# Patient Record
Sex: FEMALE | Race: WHITE | NOT HISPANIC OR LATINO | Employment: FULL TIME | URBAN - METROPOLITAN AREA
[De-identification: names, ages, dates, MRNs, and addresses within clinical notes are randomized per-mention and may not be internally consistent; named-entity substitution may affect disease eponyms.]

---

## 2019-01-09 ENCOUNTER — TRANSCRIBE ORDERS (OUTPATIENT)
Dept: ADMINISTRATIVE | Facility: HOSPITAL | Age: 44
End: 2019-01-09

## 2020-09-24 ENCOUNTER — OFFICE VISIT (OUTPATIENT)
Dept: OBSTETRICS AND GYNECOLOGY | Facility: CLINIC | Age: 45
End: 2020-09-24

## 2020-09-24 VITALS
BODY MASS INDEX: 31.71 KG/M2 | HEIGHT: 67 IN | DIASTOLIC BLOOD PRESSURE: 84 MMHG | WEIGHT: 202 LBS | SYSTOLIC BLOOD PRESSURE: 144 MMHG

## 2020-09-24 DIAGNOSIS — Z71.85 HPV VACCINE COUNSELING: ICD-10-CM

## 2020-09-24 DIAGNOSIS — N83.202 BILATERAL OVARIAN CYSTS: ICD-10-CM

## 2020-09-24 DIAGNOSIS — D39.10 OVARIAN NEOPLASM WITH LOW MALIGNANT POTENTIAL: ICD-10-CM

## 2020-09-24 DIAGNOSIS — D25.1 LEIOMYOMA, INTRAMURAL: ICD-10-CM

## 2020-09-24 DIAGNOSIS — Z01.419 WOMEN'S ANNUAL ROUTINE GYNECOLOGICAL EXAMINATION: Primary | ICD-10-CM

## 2020-09-24 DIAGNOSIS — N83.201 BILATERAL OVARIAN CYSTS: ICD-10-CM

## 2020-09-24 DIAGNOSIS — N85.2 ENLARGED UTERUS: ICD-10-CM

## 2020-09-24 PROCEDURE — 99396 PREV VISIT EST AGE 40-64: CPT | Performed by: OBSTETRICS & GYNECOLOGY

## 2020-09-24 PROCEDURE — 99212 OFFICE O/P EST SF 10 MIN: CPT | Performed by: OBSTETRICS & GYNECOLOGY

## 2020-09-24 RX ORDER — METOPROLOL SUCCINATE 25 MG/1
25 TABLET, EXTENDED RELEASE ORAL DAILY
COMMUNITY
Start: 2020-09-10

## 2020-09-24 NOTE — PROGRESS NOTES
2GYN Annual Exam     CC - Here for annual exam.     Subjective   HPI  Mayelin Penn is a 44 y.o. female, , who presents for annual well woman exam. Patient's last menstrual period was 2020..  Periods are regular every 25-35 days, lasting 5 days. The patient uses 1 of tampons/pads per hour., lasting 1 days.  Dysmenorrhea:none.  Patient reports problems with: none.  Partner Status: Marital Status: .  New Partners since last visit: no.  Desires STD Screening: no.  Patient has not had the HPV vaccine.    Additional OB/GYN History   Current contraception: contraceptive methods: Tubal ligation  Desires to: continue contraception  Last Pap : 2019 neg, neg HPV  Last Completed Pap Smear       Status Date      PAP SMEAR No completions recorded        History of abnormal Pap smear: yes - h/o cone   Last mammogram 20 benign  Family history of uterine, colon, breast, or ovarian cancer: no  Performs monthly Self-Breast Exam: yes  Exercises Regularly:no  Feelings of Anxiety or Depression: no  Tobacco Usage?: No   OB History        3    Para   3    Term                AB        Living   3       SAB        TAB        Ectopic        Molar        Multiple        Live Births                    Health Maintenance   Topic Date Due   • Annual Gynecologic Pelvic and Breast Exam  1977   • ANNUAL PHYSICAL  10/16/1980   • TDAP/TD VACCINES (1 - Tdap) 10/16/1996   • INFLUENZA VACCINE  2020   • HEPATITIS C SCREENING  2020   • PAP SMEAR  2020   • Pneumococcal Vaccine 65+ (1 of 1 - PPSV23) 10/16/2042   • Pneumococcal Vaccine 0-64  Aged Out       The additional following portions of the patient's history were reviewed and updated as appropriate: allergies, current medications, past family history, past medical history, past social history, past surgical history and problem list.    Review of Systems   Constitutional: Negative.    HENT: Negative.    Eyes: Negative.    Respiratory:  "Negative.    Cardiovascular: Negative.    Gastrointestinal: Negative.    Endocrine: Negative.    Genitourinary: Negative.    Musculoskeletal: Negative.    Skin: Negative.    Allergic/Immunologic: Negative.    Neurological: Negative.    Hematological: Negative.    Psychiatric/Behavioral: Negative.        I have reviewed and agree with the HPI, ROS, and historical information as entered above. Ana Vargas MD    Objective   /84   Ht 170.2 cm (67\")   Wt 91.6 kg (202 lb)   LMP 09/07/2020   BMI 31.64 kg/m²     Physical Exam  Vitals signs and nursing note reviewed. Exam conducted with a chaperone present.   Constitutional:       Appearance: She is well-developed.   HENT:      Head: Normocephalic and atraumatic.   Neck:      Musculoskeletal: Normal range of motion. No muscular tenderness.      Thyroid: No thyroid mass or thyromegaly.   Cardiovascular:      Rate and Rhythm: Normal rate and regular rhythm.      Heart sounds: No murmur.   Pulmonary:      Effort: Pulmonary effort is normal. No retractions.      Breath sounds: Normal breath sounds. No wheezing, rhonchi or rales.   Chest:      Chest wall: No mass or tenderness.      Breasts:         Right: Normal. No mass, nipple discharge, skin change or tenderness.         Left: Normal. No mass, nipple discharge, skin change or tenderness.   Abdominal:      General: Bowel sounds are normal.      Palpations: Abdomen is soft. Abdomen is not rigid. There is no mass.      Tenderness: There is no abdominal tenderness. There is no guarding.      Hernia: No hernia is present. There is no hernia in the left inguinal area.   Genitourinary:     Labia:         Right: No rash, tenderness or lesion.         Left: No rash, tenderness or lesion.       Vagina: Normal. No vaginal discharge or lesions.      Cervix: No cervical motion tenderness, discharge, lesion or cervical bleeding.      Uterus: Normal. Not enlarged, not fixed and not tender.       Adnexa:         Right: No mass " or tenderness.          Left: No mass or tenderness.        Rectum: No external hemorrhoid.   Neurological:      Mental Status: She is alert and oriented to person, place, and time.   Psychiatric:         Behavior: Behavior normal.         Assessment/Plan         Problem List Items Addressed This Visit     None      Visit Diagnoses     Women's annual routine gynecological examination    -  Primary    HPV vaccine counseling        Enlarged uterus        Relevant Orders    US Non-ob Transvaginal    Leiomyoma, intramural        Relevant Orders    CBC (No Diff)    Bilateral ovarian cysts        Ovarian neoplasm with low malignant potential        Relevant Orders              1. GYN annual well woman exam.     Plan     1. Recommended use of Vitamin D replacement and getting adequate calcium in her diet. (1500mg)  2. Reviewed monthly self breast exams.  Instructed to call with lumps, pain, or breast discharge.  Continue yearly mammograms.  3. Reviewed HPV guidelines and encouraged consideration of HPV vaccine.  4. Bimanual exam today suggestive of enlarged uterus - u/s shows two fibroids and bilateral ovarian complex cysts.  Discussed findings.  She is asymptomatic. Repeat u/s in 8 weeks and check  and CBC today.      Ana Vargas MD  09/24/2020

## 2020-09-25 LAB — CANCER AG125 SERPL-ACNC: 11.4 U/ML (ref 0–38.1)

## 2020-10-22 ENCOUNTER — RESULTS ENCOUNTER (OUTPATIENT)
Dept: OBSTETRICS AND GYNECOLOGY | Facility: CLINIC | Age: 45
End: 2020-10-22

## 2020-10-22 DIAGNOSIS — D25.1 LEIOMYOMA, INTRAMURAL: ICD-10-CM

## 2020-12-01 ENCOUNTER — OFFICE VISIT (OUTPATIENT)
Dept: OBSTETRICS AND GYNECOLOGY | Facility: CLINIC | Age: 45
End: 2020-12-01

## 2020-12-01 VITALS
DIASTOLIC BLOOD PRESSURE: 92 MMHG | BODY MASS INDEX: 31.71 KG/M2 | SYSTOLIC BLOOD PRESSURE: 140 MMHG | HEIGHT: 67 IN | WEIGHT: 202 LBS

## 2020-12-01 DIAGNOSIS — N83.201 CYSTS OF BOTH OVARIES: ICD-10-CM

## 2020-12-01 DIAGNOSIS — D21.9 FIBROIDS: Primary | ICD-10-CM

## 2020-12-01 DIAGNOSIS — N83.202 CYSTS OF BOTH OVARIES: ICD-10-CM

## 2020-12-01 PROCEDURE — 99213 OFFICE O/P EST LOW 20 MIN: CPT | Performed by: OBSTETRICS & GYNECOLOGY

## 2020-12-01 NOTE — PROGRESS NOTES
Chief Complaint   Patient presents with   • Follow-up     ultrasound       Subjective   HPI  Mayelin Penn is a 45 y.o. female, , who presents for follow up ultrasound. She had an annual exam on 20 and US that showed uterine fibroids and ovarian cysts.  was normal. LMP 20. Periods are regular. Denies heavy flow or pelvic pain.        Additional OB/GYN History   Current contraception: contraceptive methods: Tubal ligation  Desires to: continue contraception  Last Pap : 19 neg, neg HPV  Last Completed Pap Smear       Status Date      PAP SMEAR Done 2019 negative, HPV negative        History of abnormal Pap smear: yes - cone  Last mammogram: 20 benign  Last Completed Mammogram       Status Date      MAMMOGRAM Done 2020 benign; performed at [facility]        Tobacco Usage?: No   OB History        3    Para   3    Term   0       0    AB   0    Living   3       SAB   0    TAB   0    Ectopic   0    Molar   0    Multiple   0    Live Births   3                Health Maintenance   Topic Date Due   • ANNUAL PHYSICAL  10/16/1980   • TDAP/TD VACCINES (1 - Tdap) 10/16/1996   • INFLUENZA VACCINE  2020   • HEPATITIS C SCREENING  2020   • MAMMOGRAM  2021   • Annual Gynecologic Pelvic and Breast Exam  2021   • PAP SMEAR  2022   • Pneumococcal Vaccine 0-64  Aged Out       The additional following portions of the patient's history were reviewed and updated as appropriate: allergies, current medications, past family history, past medical history, past social history, past surgical history and problem list.    Review of Systems   Constitutional: Negative.    HENT: Negative.    Respiratory: Negative.    Cardiovascular: Negative.    Gastrointestinal: Negative.    Genitourinary: Negative.    Musculoskeletal: Negative.    Skin: Negative.    Allergic/Immunologic: Negative.    Neurological: Negative.    Hematological: Negative.   "  Psychiatric/Behavioral: Negative.        I have reviewed and agree with the HPI, ROS, and historical information as entered above. Ana Vargas MD    Objective   /92   Ht 170.2 cm (67\")   Wt 91.6 kg (202 lb)   LMP 11/26/2020   BMI 31.64 kg/m²     Physical Exam  Constitutional:       Appearance: She is well-developed.   HENT:      Head: Normocephalic.   Eyes:      Conjunctiva/sclera: Conjunctivae normal.   Pulmonary:      Effort: Pulmonary effort is normal.   Psychiatric:         Behavior: Behavior normal.         Assessment/Plan         Problem List Items Addressed This Visit     None      Visit Diagnoses     Fibroids    -  Primary    Relevant Orders    US Non-ob Transvaginal (Completed)    Cysts of both ovaries        Relevant Orders                  Plan     Return in about 3 months (around 3/1/2021) for US with Next Visit.   2.   Reviewed u/s - there has been no change in fibroid or bilateral ovarian cysts.  Last  normal.  She does not want surgery.  Discussed risk of torsion if these do not resolve.  Reviewed u/s images.  Will recheck  and reevaluate in 3 months.  If cysts do not resolve will need to consider surgical removal at which time she could have hysterectomy due to fibroids.  She will consider her options.    Ana Vargas MD  12/01/2020  "

## 2020-12-02 LAB — CANCER AG125 SERPL-ACNC: 12.3 U/ML (ref 0–38.1)

## 2021-03-08 DIAGNOSIS — N83.201 BILATERAL OVARIAN CYSTS: Primary | ICD-10-CM

## 2021-03-08 DIAGNOSIS — D21.9 FIBROIDS: ICD-10-CM

## 2021-03-08 DIAGNOSIS — N83.202 BILATERAL OVARIAN CYSTS: Primary | ICD-10-CM

## 2021-03-09 ENCOUNTER — OFFICE VISIT (OUTPATIENT)
Dept: OBSTETRICS AND GYNECOLOGY | Facility: CLINIC | Age: 46
End: 2021-03-09

## 2021-03-09 VITALS
BODY MASS INDEX: 31.86 KG/M2 | DIASTOLIC BLOOD PRESSURE: 90 MMHG | HEIGHT: 67 IN | SYSTOLIC BLOOD PRESSURE: 130 MMHG | WEIGHT: 203 LBS

## 2021-03-09 DIAGNOSIS — N83.202 BILATERAL OVARIAN CYSTS: ICD-10-CM

## 2021-03-09 DIAGNOSIS — D21.9 FIBROIDS: ICD-10-CM

## 2021-03-09 DIAGNOSIS — N83.201 BILATERAL OVARIAN CYSTS: ICD-10-CM

## 2021-03-09 PROCEDURE — 99213 OFFICE O/P EST LOW 20 MIN: CPT | Performed by: OBSTETRICS & GYNECOLOGY

## 2021-03-09 PROCEDURE — 76830 TRANSVAGINAL US NON-OB: CPT | Performed by: OBSTETRICS & GYNECOLOGY

## 2021-03-09 RX ORDER — CEPHALEXIN 500 MG/1
CAPSULE ORAL
COMMUNITY
Start: 2021-03-02 | End: 2021-11-08

## 2021-03-09 NOTE — PROGRESS NOTES
Chief Complaint   Patient presents with   • Follow-up     fibroids, ovarian cysts       Subjective   HPI  Mayelin Penn is a 45 y.o. female, , who presents for f/u on ovarian cysts and fibroids.      Pt denies associated pain, intermenstrual bleeding, and bleeding during intercourse.  Her last  on  was 12.3.        Her last LMP was Patient's last menstrual period was 2021 (exact date)..  Periods are regular every 28-30 days, lasting 5 days.  Dysmenorrhea:none.  Patient reports problems with: heavy periods.  Partner Status: Marital Status: .  New Partners since last visit: no.  Desires STD Screening: no.    Additional OB/GYN History   Current contraception: contraceptive methods: Tubal ligation     Last Pap :   Last Completed Pap Smear       Status Date      PAP SMEAR Done 2019 negative, HPV negative          Last mammogram:   Last Completed Mammogram       Status Date      MAMMOGRAM Done 2020 benign; performed at [facility]        Tobacco Usage?: No   OB History        3    Para   3    Term   0       0    AB   0    Living   3       SAB   0    TAB   0    Ectopic   0    Molar   0    Multiple   0    Live Births   3                Health Maintenance   Topic Date Due   • ANNUAL PHYSICAL  Never done   • TDAP/TD VACCINES (1 - Tdap) Never done   • INFLUENZA VACCINE  Never done   • HEPATITIS C SCREENING  Never done   • MAMMOGRAM  2021   • Annual Gynecologic Pelvic and Breast Exam  2021   • PAP SMEAR  2022   • Pneumococcal Vaccine 0-64  Aged Out   • MENINGOCOCCAL VACCINE  Aged Out       The additional following portions of the patient's history were reviewed and updated as appropriate: allergies, current medications, past family history, past medical history, past social history, past surgical history and problem list.    Review of Systems    I have reviewed and agree with the HPI, ROS, and historical information as entered above. Ana ALMODOVAR  "MD Sam    Objective   /90   Ht 170.2 cm (67\")   Wt 92.1 kg (203 lb)   LMP 03/08/2021 (Exact Date)   Breastfeeding No   BMI 31.79 kg/m²     Physical Exam  Constitutional:       Appearance: She is well-developed.   HENT:      Head: Normocephalic.   Eyes:      Conjunctiva/sclera: Conjunctivae normal.   Pulmonary:      Effort: Pulmonary effort is normal.   Psychiatric:         Behavior: Behavior normal.         Assessment/Plan     Assessment     Problem List Items Addressed This Visit     None      Visit Diagnoses     Bilateral ovarian cysts        Relevant Orders     (Completed)    US Non-ob Transvaginal    Fibroids                Plan     Return in about 3 months (around 6/9/2021) for US with Next Visit.  2.  Reviewed u/s and fibroids and ovarian cysts are stable compared with last exam.  She does not want intervention at this time.  F/U in 3 months with  today.      Ana Vargas MD  03/09/2021  "

## 2021-03-10 LAB — CANCER AG125 SERPL-ACNC: 9.5 U/ML (ref 0–38.1)

## 2021-06-10 ENCOUNTER — OFFICE VISIT (OUTPATIENT)
Dept: OBSTETRICS AND GYNECOLOGY | Facility: CLINIC | Age: 46
End: 2021-06-10

## 2021-06-10 VITALS
HEIGHT: 67 IN | SYSTOLIC BLOOD PRESSURE: 130 MMHG | WEIGHT: 207.4 LBS | DIASTOLIC BLOOD PRESSURE: 80 MMHG | BODY MASS INDEX: 32.55 KG/M2

## 2021-06-10 DIAGNOSIS — D25.1 INTRAMURAL LEIOMYOMA OF UTERUS: Primary | ICD-10-CM

## 2021-06-10 DIAGNOSIS — N83.201 CYST OF RIGHT OVARY: ICD-10-CM

## 2021-06-10 PROCEDURE — 99213 OFFICE O/P EST LOW 20 MIN: CPT | Performed by: OBSTETRICS & GYNECOLOGY

## 2021-06-10 NOTE — PROGRESS NOTES
Chief Complaint   Patient presents with   • Follow-up       Subjective   HPI  Mayelin Penn is a 45 y.o. female, , who presents for follow up on fibroids and ovarian cysts. Patient had an TVUS today. Patient states that she continues to not have any pain in bleeding during intercourse.       Her last LMP was Patient's last menstrual period was 2021..  Periods are regular every 28-30 days, lasting 5 days.  Dysmenorrhea:mild, occurring premenstrually and first 1-2 days of flow.  Patient reports problems with: none.  Partner Status: Marital Status: .  New Partners since last visit: no.  Desires STD Screening: no.    Additional OB/GYN History   Current contraception: contraceptive methods: Tubal ligation  Desires to: do not start contraception  Last Pap :   Last Completed Pap Smear          PAP SMEAR (Every 3 Years) Next due on 2019  Done - negative, HPV negative              History of abnormal Pap smear:   Last mammogram: 2020  Last Completed Mammogram     This patient has no relevant Health Maintenance data.        Tobacco Usage?: No   OB History        3    Para   3    Term   0       0    AB   0    Living   3       SAB   0    TAB   0    Ectopic   0    Molar   0    Multiple   0    Live Births   3                Health Maintenance   Topic Date Due   • ANNUAL PHYSICAL  Never done   • COVID-19 Vaccine (1) Never done   • TDAP/TD VACCINES (1 - Tdap) Never done   • HEPATITIS C SCREENING  Never done   • MAMMOGRAM  2021   • INFLUENZA VACCINE  2021   • Annual Gynecologic Pelvic and Breast Exam  2021   • PAP SMEAR  2022   • Pneumococcal Vaccine 0-64  Aged Out       The additional following portions of the patient's history were reviewed and updated as appropriate: allergies, current medications, past family history, past medical history, past social history, past surgical history and problem list.    Review of Systems   Constitutional: Negative.  "   HENT: Negative.    Eyes: Negative.    Respiratory: Negative.    Cardiovascular: Negative.    Gastrointestinal: Negative.    Endocrine: Negative.    Genitourinary: Negative.    Musculoskeletal: Negative.    Skin: Negative.    Allergic/Immunologic: Negative.    Neurological: Negative.    Hematological: Negative.    Psychiatric/Behavioral: The patient is nervous/anxious.        I have reviewed and agree with the HPI, ROS, and historical information as entered above. Ana Vargas MD    Objective   /80   Ht 170.2 cm (67\")   Wt 94.1 kg (207 lb 6.4 oz)   LMP 05/17/2021   BMI 32.48 kg/m²     Physical Exam  Constitutional:       Appearance: She is well-developed.   HENT:      Head: Normocephalic.   Eyes:      Conjunctiva/sclera: Conjunctivae normal.   Pulmonary:      Effort: Pulmonary effort is normal.   Psychiatric:         Behavior: Behavior normal.         Assessment/Plan     Encounter Diagnoses   Name Primary?   • Intramural leiomyoma of uterus Yes   • Cyst of right ovary        Plan     1.  No interval changes.  Patient does not want surgical intervention at this time as she is asymptomatic and nothing is changing on u/s.  Will repeat labs and f/u for u/s and annual in 3 months.      Ana Vargas MD  06/10/2021  "

## 2021-06-11 LAB — CANCER AG125 SERPL-ACNC: 10.9 U/ML (ref 0–38.1)

## 2021-07-22 ENCOUNTER — TELEPHONE (OUTPATIENT)
Dept: OBSTETRICS AND GYNECOLOGY | Facility: CLINIC | Age: 46
End: 2021-07-22

## 2021-07-29 DIAGNOSIS — Z12.31 SCREENING MAMMOGRAM, ENCOUNTER FOR: Primary | ICD-10-CM

## 2021-10-06 ENCOUNTER — OFFICE VISIT (OUTPATIENT)
Dept: OBSTETRICS AND GYNECOLOGY | Facility: CLINIC | Age: 46
End: 2021-10-06

## 2021-10-06 VITALS
SYSTOLIC BLOOD PRESSURE: 138 MMHG | BODY MASS INDEX: 32.3 KG/M2 | DIASTOLIC BLOOD PRESSURE: 100 MMHG | HEIGHT: 66 IN | WEIGHT: 201 LBS

## 2021-10-06 DIAGNOSIS — D25.1 INTRAMURAL LEIOMYOMA OF UTERUS: Primary | ICD-10-CM

## 2021-10-06 DIAGNOSIS — Z12.39 ENCOUNTER FOR BREAST CANCER SCREENING USING NON-MAMMOGRAM MODALITY: ICD-10-CM

## 2021-10-06 DIAGNOSIS — Z01.419 WOMEN'S ANNUAL ROUTINE GYNECOLOGICAL EXAMINATION: ICD-10-CM

## 2021-10-06 DIAGNOSIS — N83.209 CYST OF OVARY, UNSPECIFIED LATERALITY: ICD-10-CM

## 2021-10-06 PROCEDURE — 99396 PREV VISIT EST AGE 40-64: CPT | Performed by: OBSTETRICS & GYNECOLOGY

## 2021-10-06 PROCEDURE — 99213 OFFICE O/P EST LOW 20 MIN: CPT | Performed by: OBSTETRICS & GYNECOLOGY

## 2021-10-06 NOTE — PROGRESS NOTES
GYN Annual Exam     CC - Here for annual exam.     Subjective   HPI  Mayelin Penn is a 45 y.o. female, , who presents for annual well woman exam. Patient's last menstrual period was 2021..  Periods are regular every 25-35 days, lasting 5-7 days. The flow is moderate.  Dysmenorrhea:mild, occurring first 1-2 days of flow.  Patient reports problems with: none.  Partner Status: Marital Status: .  New Partners since last visit: no.  Desires STD Screening: no.  Patient has not had the HPV vaccine.     Additional OB/GYN History     Current contraception: contraceptive methods: Tubal ligation  Desires to: do not start contraception  Last Pap :   Last Completed Pap Smear          PAP SMEAR (Every 3 Years) Next due on 2019  Done - negative, HPV negative              History of abnormal Pap smear: yes - h/o cervical cone  Family history of uterine, colon, breast, or ovarian cancer: no  Previous Mammogram :  yes - 2021-at [facility]-negative per patient  Performs monthly Self-Breast Exam: yes  Exercises Regularly:yes  Feelings of Anxiety or Depression: yes - anxiety  Tobacco Usage?: No   OB History        3    Para   3    Term   0       0    AB   0    Living   3       SAB   0    TAB   0    Ectopic   0    Molar   0    Multiple   0    Live Births   3                Health Maintenance   Topic Date Due   • ANNUAL PHYSICAL  Never done   • COVID-19 Vaccine (1) Never done   • TDAP/TD VACCINES (1 - Tdap) Never done   • HEPATITIS C SCREENING  Never done   • MAMMOGRAM  2021   • INFLUENZA VACCINE  Never done   • Annual Gynecologic Pelvic and Breast Exam  2021   • PAP SMEAR  2022   • Pneumococcal Vaccine 0-64  Aged Out     Past Surgical History:   Procedure Laterality Date   • APPENDECTOMY     • CERVICAL BIOPSY  2019    benign, negative for dysplasia or carcinoma   • CERVICAL CONIZATION     •  SECTION     • TUBAL ABDOMINAL LIGATION             The  "additional following portions of the patient's history were reviewed and updated as appropriate: allergies, current medications, past family history, past medical history, past social history and past surgical history.    Review of Systems    I have reviewed and agree with the HPI, ROS, and historical information as entered above. Ana Vargas MD    Objective   /100   Ht 167.6 cm (66\")   Wt 91.2 kg (201 lb)   LMP 09/28/2021   Breastfeeding No   BMI 32.44 kg/m²     Physical Exam  Vitals and nursing note reviewed. Exam conducted with a chaperone present.   Constitutional:       Appearance: She is well-developed.   HENT:      Head: Normocephalic and atraumatic.   Neck:      Thyroid: No thyroid mass or thyromegaly.   Cardiovascular:      Rate and Rhythm: Normal rate and regular rhythm.      Heart sounds: No murmur heard.     Pulmonary:      Effort: Pulmonary effort is normal. No retractions.      Breath sounds: Normal breath sounds. No wheezing, rhonchi or rales.   Chest:      Chest wall: No mass or tenderness.      Breasts:         Right: Normal. No mass, nipple discharge, skin change or tenderness.         Left: Normal. No mass, nipple discharge, skin change or tenderness.   Abdominal:      General: Bowel sounds are normal.      Palpations: Abdomen is soft. Abdomen is not rigid. There is no mass.      Tenderness: There is no abdominal tenderness. There is no guarding.      Hernia: No hernia is present. There is no hernia in the left inguinal area.   Genitourinary:     Labia:         Right: No rash, tenderness or lesion.         Left: No rash, tenderness or lesion.       Vagina: Normal. No vaginal discharge or lesions.      Cervix: No cervical motion tenderness, discharge, lesion or cervical bleeding.      Uterus: Normal. Not enlarged, not fixed and not tender.       Adnexa:         Right: No mass or tenderness.          Left: No mass or tenderness.        Rectum: No external hemorrhoid. "   Musculoskeletal:      Cervical back: Normal range of motion. No muscular tenderness.   Neurological:      Mental Status: She is alert and oriented to person, place, and time.   Psychiatric:         Behavior: Behavior normal.         Assessment/Plan       Encounter Diagnoses   Name Primary?   • Intramural leiomyoma of uterus Yes   • Cyst of ovary, unspecified laterality    • Women's annual routine gynecological examination    • Encounter for breast cancer screening using non-mammogram modality        Plan     1. Recommended use of Vitamin D replacement and getting adequate calcium in her diet. (1500mg)  2. Reviewed monthly self breast exams.  Instructed to call with lumps, pain, or breast discharge.    3. Continue yearly mammography  4. Reviewed HPV guidelines.  5. Reviewed exercise as a preventative health measures.    6. Ovarian cyst on right has increased in size.  Fibroids are stable.  Rec this be removed.  She can choose laparoscopic removal with or without hysterectomy for the fibroids.         Ana Vargas MD  10/06/2021

## 2021-11-04 ENCOUNTER — OFFICE VISIT (OUTPATIENT)
Dept: OBSTETRICS AND GYNECOLOGY | Facility: CLINIC | Age: 46
End: 2021-11-04

## 2021-11-04 VITALS
HEIGHT: 66 IN | BODY MASS INDEX: 32.14 KG/M2 | SYSTOLIC BLOOD PRESSURE: 130 MMHG | WEIGHT: 200 LBS | DIASTOLIC BLOOD PRESSURE: 92 MMHG

## 2021-11-04 DIAGNOSIS — D25.1 INTRAMURAL LEIOMYOMA OF UTERUS: ICD-10-CM

## 2021-11-04 DIAGNOSIS — N83.201 CYST OF RIGHT OVARY: Primary | ICD-10-CM

## 2021-11-04 PROCEDURE — S0260 H&P FOR SURGERY: HCPCS | Performed by: OBSTETRICS & GYNECOLOGY

## 2021-11-04 NOTE — PROGRESS NOTES
"Pt getting increasingly agitated and verbally aggressive, this RN suspecting possible ETOH detox d/t sx/behaviors. Asked pt if he drinks, pt stated \"I love alcohol.\" When asked about quantity pt stated \"I don't know, a lot.\" Pt reports more than 3 drinks per day. MD updated, [place] protocol ordered.   " Pre-Op Visit    Chief Complaint   Patient presents with   • Pre-op Exam       HPI  Mayelin Penn is 46 y.o.  scheduled to have Laparoscopic Right oophrectomy, possible laparotomy at [facility] on 11/10/21 at 10:30am.  Her pre operative diagnosis is     ICD-10-CM ICD-9-CM   1. Cyst of right ovary  N83.201 620.2   2. Intramural leiomyoma of uterus  D25.1 218.1   . Her last menstrual period was Patient's last menstrual period was 10/28/2021..  Her birth control method is bilateral tubal ligation. Her BMI is Body mass index is 32.28 kg/m²..    She has review the [employer] Pamphlet on Laparoscopy.    She understand the risks of bleeding, infections, possible damage to other organ systems, including but not limited to the gastrointestinal tract and genitourinary tract. She also understands the specific risks listed in the pre op information (video ,pamphlets, etc.)    She has review and signed the pre op consent form.    She has been instructed to have a light dinner the night before surgery, then nothing to eat or drink after midnight.  She is on the following medications:  Outpatient Encounter Medications as of 2021   Medication Sig Dispense Refill   • cephalexin (KEFLEX) 500 MG capsule      • metoprolol succinate XL (TOPROL-XL) 25 MG 24 hr tablet Take 25 mg by mouth Daily.     • mupirocin (BACTROBAN) 2 % ointment      • sertraline (ZOLOFT) 50 MG tablet Take 50 mg by mouth Daily.       No facility-administered encounter medications on file as of 2021.         The day of surgery, do not chew gum or smoke. Remove all jewelry, nail polish and contact lens prior to coming to the hospital. Do not bring large sums of money or valuables.  Arrive at the hospital at 8:30 am.  You will talk with the anesthesiologist that morning.  An IV will be started to provide fluids and sedation.  The procedure will take approximately 2 hour(s).  You will need to have someone drive you home after the surgery.    Pre  "Admission testing has been scheduled for 11/8/21 at [facility].    She has confirmed that she is not allergic to latex.    The additional following portions of the patient's history were reviewed and updated as appropriate: allergies, current medications, past family history, past medical history, past social history, past surgical history and problem list.    Review of Systems    I have reviewed and agree with the HPI, ROS, and historical information as entered above. Ana Vargas MD    Objective   /92   Ht 167.6 cm (66\")   Wt 90.7 kg (200 lb)   LMP 10/28/2021   BMI 32.28 kg/m²     Physical Exam  Vitals and nursing note reviewed. Exam conducted with a chaperone present.   Constitutional:       Appearance: She is well-developed.   HENT:      Head: Normocephalic and atraumatic.   Cardiovascular:      Rate and Rhythm: Normal rate and regular rhythm.   Pulmonary:      Effort: Pulmonary effort is normal.      Breath sounds: Normal breath sounds.   Abdominal:      General: Bowel sounds are normal.      Palpations: Abdomen is soft. Abdomen is not rigid.   Musculoskeletal:      Cervical back: Normal range of motion. No muscular tenderness.   Skin:     General: Skin is warm and dry.   Neurological:      Mental Status: She is alert and oriented to person, place, and time.   Psychiatric:         Behavior: Behavior normal.         Assessment/Plan     Assessment     Problem List Items Addressed This Visit        Genitourinary and Reproductive     Cyst of right ovary - Primary      Other Visit Diagnoses     Intramural leiomyoma of uterus                    Instructions:  1. Discussed all options for management and she would like the most conservative measures to avoid laparotomy and menopause.  We previously discussed all pros and cons regarding hysterectomy versus just removal of the large right ovary and she opts for removal of the right ovary only unless absolutely necessary.  2. PAT scheduled at [facility] " [place]  3. Review with the patient the risk of bleeding, infections, possible damage to other organ systems, including but not limited to the gastrointestinal tract and genitourinary tract.  Reviewed the risk of anesthesia as well as the risk the surgery will not produce the desired results.  Operative permit signed and scanned into the chart.  4. Understands risk of laparotomy and should this cyst turned out to be borderline or malignant the need for full hysterectomy.  She also understands the potential for future hysterectomy leaving behind of fibroids    Ana Vargas MD

## 2021-11-08 ENCOUNTER — PRE-ADMISSION TESTING (OUTPATIENT)
Dept: PREADMISSION TESTING | Facility: HOSPITAL | Age: 46
End: 2021-11-08

## 2021-11-08 VITALS — BODY MASS INDEX: 31.63 KG/M2 | WEIGHT: 201.5 LBS | HEIGHT: 67 IN

## 2021-11-08 DIAGNOSIS — Z01.818 PRE-OP TESTING: Primary | ICD-10-CM

## 2021-11-08 LAB
ABO GROUP BLD: NORMAL
BLD GP AB SCN SERPL QL: NEGATIVE
DEPRECATED RDW RBC AUTO: 44 FL (ref 37–54)
ERYTHROCYTE [DISTWIDTH] IN BLOOD BY AUTOMATED COUNT: 14.7 % (ref 12.3–15.4)
HCT VFR BLD AUTO: 37 % (ref 34–46.6)
HGB BLD-MCNC: 11.5 G/DL (ref 12–15.9)
MCH RBC QN AUTO: 25.7 PG (ref 26.6–33)
MCHC RBC AUTO-ENTMCNC: 31.1 G/DL (ref 31.5–35.7)
MCV RBC AUTO: 82.6 FL (ref 79–97)
PLATELET # BLD AUTO: 256 10*3/MM3 (ref 140–450)
PMV BLD AUTO: 9.9 FL (ref 6–12)
POTASSIUM SERPL-SCNC: 4.4 MMOL/L (ref 3.5–5.2)
QT INTERVAL: 364 MS
QTC INTERVAL: 409 MS
RBC # BLD AUTO: 4.48 10*6/MM3 (ref 3.77–5.28)
RH BLD: POSITIVE
SARS-COV-2 RNA PNL SPEC NAA+PROBE: NOT DETECTED
T&S EXPIRATION DATE: NORMAL
WBC # BLD AUTO: 5.16 10*3/MM3 (ref 3.4–10.8)

## 2021-11-08 PROCEDURE — 93010 ELECTROCARDIOGRAM REPORT: CPT | Performed by: INTERNAL MEDICINE

## 2021-11-08 PROCEDURE — 86850 RBC ANTIBODY SCREEN: CPT

## 2021-11-08 PROCEDURE — 86901 BLOOD TYPING SEROLOGIC RH(D): CPT

## 2021-11-08 PROCEDURE — 93005 ELECTROCARDIOGRAM TRACING: CPT

## 2021-11-08 PROCEDURE — 85027 COMPLETE CBC AUTOMATED: CPT

## 2021-11-08 PROCEDURE — 84132 ASSAY OF SERUM POTASSIUM: CPT

## 2021-11-08 PROCEDURE — 36415 COLL VENOUS BLD VENIPUNCTURE: CPT

## 2021-11-08 PROCEDURE — C9803 HOPD COVID-19 SPEC COLLECT: HCPCS

## 2021-11-08 PROCEDURE — U0004 COV-19 TEST NON-CDC HGH THRU: HCPCS

## 2021-11-08 PROCEDURE — 86900 BLOOD TYPING SEROLOGIC ABO: CPT

## 2021-11-08 RX ORDER — MULTIPLE VITAMINS W/ MINERALS TAB 9MG-400MCG
1 TAB ORAL DAILY
COMMUNITY

## 2021-11-08 NOTE — PAT
An arrival time for procedure was not given during PAT visit. If patient had any questions or concerns about their arrival time, they were instructed to contact their surgeon/physician.  Additionally, if the patient referred to an arrival time that was acquired from their my chart account, patient was encouraged to verify that time with their surgeon/physician.  NO arrival times given in Pre Admission Testing Department.    Patient to apply Chlorhexadine wipes  to surgical area (as instructed) the night before procedure and the AM of procedure. Wipes provided.    Patient viewed general PAT education video as instructed in their preoperative information received from their surgeon.  Patient stated the general PAT education video was viewed in its entirety and survey completed.  Copies of PAT general education handouts (Incentive Spirometry, Meds to Beds Program, Patient Belongings, Pre-op skin preparation instructions, Blood Glucose testing, Visitor policy, Surgery FAQ, Code H) distributed to patient if not printed. Education related to the PAT pass and skin preparation for surgery (if applicable) completed in PAT as a reinforcement to PAT education video. Patient instructed to return PAT pass provided today as well as completed skin preparation sheet (if applicable) on the day of procedure.     Additionally if patient had not viewed video yet but intended to view it at home or in our waiting area, then referred them to the handout with QR code/link provided during PAT visit.  Instructed patient to complete survey after viewing the video in its entirety.  Encouraged patient/family to read PAT general education handouts thoroughly and notify PAT staff with any questions or concerns. Patient verbalized understanding of all information and priority content.    PT TO SIGN CONSENT DOS. NO ORDER IN [facility].

## 2021-11-10 ENCOUNTER — ANESTHESIA EVENT (OUTPATIENT)
Dept: PERIOP | Facility: HOSPITAL | Age: 46
End: 2021-11-10

## 2021-11-10 ENCOUNTER — HOSPITAL ENCOUNTER (OUTPATIENT)
Facility: HOSPITAL | Age: 46
Setting detail: HOSPITAL OUTPATIENT SURGERY
Discharge: HOME | End: 2021-11-10
Attending: OBSTETRICS & GYNECOLOGY | Admitting: OBSTETRICS & GYNECOLOGY

## 2021-11-10 ENCOUNTER — ANESTHESIA (OUTPATIENT)
Dept: PERIOP | Facility: HOSPITAL | Age: 46
End: 2021-11-10

## 2021-11-10 VITALS
SYSTOLIC BLOOD PRESSURE: 112 MMHG | TEMPERATURE: 97.6 F | WEIGHT: 201 LBS | HEART RATE: 64 BPM | OXYGEN SATURATION: 95 % | DIASTOLIC BLOOD PRESSURE: 73 MMHG | HEIGHT: 67 IN | RESPIRATION RATE: 16 BRPM | BODY MASS INDEX: 31.55 KG/M2

## 2021-11-10 DIAGNOSIS — N83.8 OVARIAN MASS, RIGHT: ICD-10-CM

## 2021-11-10 DIAGNOSIS — G89.18 POSTOPERATIVE PAIN: Primary | ICD-10-CM

## 2021-11-10 LAB
B-HCG UR QL: NEGATIVE
EXPIRATION DATE: NORMAL
INTERNAL NEGATIVE CONTROL: NORMAL
INTERNAL POSITIVE CONTROL: NORMAL
Lab: NORMAL

## 2021-11-10 PROCEDURE — 58662 LAPAROSCOPY EXCISE LESIONS: CPT | Performed by: OBSTETRICS & GYNECOLOGY

## 2021-11-10 PROCEDURE — 25010000002 FENTANYL CITRATE (PF) 50 MCG/ML SOLUTION

## 2021-11-10 PROCEDURE — 25010000002 FENTANYL CITRATE (PF) 50 MCG/ML SOLUTION: Performed by: NURSE ANESTHETIST, CERTIFIED REGISTERED

## 2021-11-10 PROCEDURE — 58661 LAPAROSCOPY REMOVE ADNEXA: CPT | Performed by: OBSTETRICS & GYNECOLOGY

## 2021-11-10 PROCEDURE — 88305 TISSUE EXAM BY PATHOLOGIST: CPT | Performed by: OBSTETRICS & GYNECOLOGY

## 2021-11-10 PROCEDURE — 25010000002 PROPOFOL 10 MG/ML EMULSION: Performed by: NURSE ANESTHETIST, CERTIFIED REGISTERED

## 2021-11-10 PROCEDURE — 25010000002 ONDANSETRON PER 1 MG: Performed by: NURSE ANESTHETIST, CERTIFIED REGISTERED

## 2021-11-10 PROCEDURE — 25010000002 MIDAZOLAM PER 1 MG: Performed by: NURSE ANESTHETIST, CERTIFIED REGISTERED

## 2021-11-10 PROCEDURE — 81025 URINE PREGNANCY TEST: CPT | Performed by: OBSTETRICS & GYNECOLOGY

## 2021-11-10 PROCEDURE — 25010000002 NEOSTIGMINE 10 MG/10ML SOLUTION: Performed by: NURSE ANESTHETIST, CERTIFIED REGISTERED

## 2021-11-10 PROCEDURE — 88307 TISSUE EXAM BY PATHOLOGIST: CPT | Performed by: OBSTETRICS & GYNECOLOGY

## 2021-11-10 PROCEDURE — 25010000002 DEXAMETHASONE PER 1 MG: Performed by: NURSE ANESTHETIST, CERTIFIED REGISTERED

## 2021-11-10 RX ORDER — ESMOLOL HYDROCHLORIDE 10 MG/ML
INJECTION INTRAVENOUS AS NEEDED
Status: DISCONTINUED | OUTPATIENT
Start: 2021-11-10 | End: 2021-11-10 | Stop reason: SURG

## 2021-11-10 RX ORDER — HYDRALAZINE HYDROCHLORIDE 20 MG/ML
5 INJECTION INTRAMUSCULAR; INTRAVENOUS
Status: CANCELLED | OUTPATIENT
Start: 2021-11-10

## 2021-11-10 RX ORDER — PROMETHAZINE HYDROCHLORIDE 25 MG/1
25 SUPPOSITORY RECTAL ONCE AS NEEDED
Status: DISCONTINUED | OUTPATIENT
Start: 2021-11-10 | End: 2021-11-10 | Stop reason: HOSPADM

## 2021-11-10 RX ORDER — GLYCOPYRROLATE 0.2 MG/ML
INJECTION INTRAMUSCULAR; INTRAVENOUS AS NEEDED
Status: DISCONTINUED | OUTPATIENT
Start: 2021-11-10 | End: 2021-11-10 | Stop reason: SURG

## 2021-11-10 RX ORDER — PROMETHAZINE HYDROCHLORIDE 25 MG/1
25 SUPPOSITORY RECTAL ONCE AS NEEDED
Status: CANCELLED | OUTPATIENT
Start: 2021-11-10

## 2021-11-10 RX ORDER — DROPERIDOL 2.5 MG/ML
0.62 INJECTION, SOLUTION INTRAMUSCULAR; INTRAVENOUS AS NEEDED
Status: CANCELLED | OUTPATIENT
Start: 2021-11-10

## 2021-11-10 RX ORDER — PROMETHAZINE HYDROCHLORIDE 25 MG/1
25 TABLET ORAL ONCE AS NEEDED
Status: CANCELLED | OUTPATIENT
Start: 2021-11-10

## 2021-11-10 RX ORDER — FENTANYL CITRATE 50 UG/ML
INJECTION, SOLUTION INTRAMUSCULAR; INTRAVENOUS AS NEEDED
Status: DISCONTINUED | OUTPATIENT
Start: 2021-11-10 | End: 2021-11-10 | Stop reason: SURG

## 2021-11-10 RX ORDER — NALOXONE HCL 0.4 MG/ML
0.4 VIAL (ML) INJECTION AS NEEDED
Status: CANCELLED | OUTPATIENT
Start: 2021-11-10

## 2021-11-10 RX ORDER — PROMETHAZINE HYDROCHLORIDE 25 MG/1
25 TABLET ORAL ONCE AS NEEDED
Status: DISCONTINUED | OUTPATIENT
Start: 2021-11-10 | End: 2021-11-10 | Stop reason: HOSPADM

## 2021-11-10 RX ORDER — FAMOTIDINE 10 MG/ML
20 INJECTION, SOLUTION INTRAVENOUS ONCE
Status: CANCELLED | OUTPATIENT
Start: 2021-11-10 | End: 2021-11-10

## 2021-11-10 RX ORDER — MIDAZOLAM HYDROCHLORIDE 1 MG/ML
1 INJECTION INTRAMUSCULAR; INTRAVENOUS
Status: DISCONTINUED | OUTPATIENT
Start: 2021-11-10 | End: 2021-11-10 | Stop reason: HOSPADM

## 2021-11-10 RX ORDER — ONDANSETRON 2 MG/ML
4 INJECTION INTRAMUSCULAR; INTRAVENOUS ONCE AS NEEDED
Status: DISCONTINUED | OUTPATIENT
Start: 2021-11-10 | End: 2021-11-10 | Stop reason: HOSPADM

## 2021-11-10 RX ORDER — PROPOFOL 10 MG/ML
VIAL (ML) INTRAVENOUS AS NEEDED
Status: DISCONTINUED | OUTPATIENT
Start: 2021-11-10 | End: 2021-11-10 | Stop reason: SURG

## 2021-11-10 RX ORDER — DROPERIDOL 2.5 MG/ML
0.62 INJECTION, SOLUTION INTRAMUSCULAR; INTRAVENOUS ONCE AS NEEDED
Status: DISCONTINUED | OUTPATIENT
Start: 2021-11-10 | End: 2021-11-10 | Stop reason: HOSPADM

## 2021-11-10 RX ORDER — MEPERIDINE HYDROCHLORIDE 25 MG/ML
12.5 INJECTION INTRAMUSCULAR; INTRAVENOUS; SUBCUTANEOUS
Status: CANCELLED | OUTPATIENT
Start: 2021-11-10 | End: 2021-11-11

## 2021-11-10 RX ORDER — FAMOTIDINE 20 MG/1
20 TABLET, FILM COATED ORAL ONCE
Status: COMPLETED | OUTPATIENT
Start: 2021-11-10 | End: 2021-11-10

## 2021-11-10 RX ORDER — LIDOCAINE HYDROCHLORIDE 10 MG/ML
INJECTION, SOLUTION EPIDURAL; INFILTRATION; INTRACAUDAL; PERINEURAL AS NEEDED
Status: DISCONTINUED | OUTPATIENT
Start: 2021-11-10 | End: 2021-11-10 | Stop reason: SURG

## 2021-11-10 RX ORDER — SODIUM CHLORIDE 0.9 % (FLUSH) 0.9 %
10 SYRINGE (ML) INJECTION AS NEEDED
Status: DISCONTINUED | OUTPATIENT
Start: 2021-11-10 | End: 2021-11-10 | Stop reason: HOSPADM

## 2021-11-10 RX ORDER — SODIUM CHLORIDE, SODIUM LACTATE, POTASSIUM CHLORIDE, CALCIUM CHLORIDE 600; 310; 30; 20 MG/100ML; MG/100ML; MG/100ML; MG/100ML
INJECTION, SOLUTION INTRAVENOUS CONTINUOUS PRN
Status: DISCONTINUED | OUTPATIENT
Start: 2021-11-10 | End: 2021-11-10 | Stop reason: SURG

## 2021-11-10 RX ORDER — ONDANSETRON 2 MG/ML
INJECTION INTRAMUSCULAR; INTRAVENOUS AS NEEDED
Status: DISCONTINUED | OUTPATIENT
Start: 2021-11-10 | End: 2021-11-10 | Stop reason: SURG

## 2021-11-10 RX ORDER — NEOSTIGMINE METHYLSULFATE 1 MG/ML
INJECTION, SOLUTION INTRAVENOUS AS NEEDED
Status: DISCONTINUED | OUTPATIENT
Start: 2021-11-10 | End: 2021-11-10 | Stop reason: SURG

## 2021-11-10 RX ORDER — HYDROCODONE BITARTRATE AND ACETAMINOPHEN 5; 325 MG/1; MG/1
TABLET ORAL
Status: COMPLETED
Start: 2021-11-10 | End: 2021-11-10

## 2021-11-10 RX ORDER — SODIUM CHLORIDE 0.9 % (FLUSH) 0.9 %
3 SYRINGE (ML) INJECTION EVERY 12 HOURS SCHEDULED
Status: DISCONTINUED | OUTPATIENT
Start: 2021-11-10 | End: 2021-11-10 | Stop reason: HOSPADM

## 2021-11-10 RX ORDER — ONDANSETRON 2 MG/ML
4 INJECTION INTRAMUSCULAR; INTRAVENOUS ONCE AS NEEDED
Status: CANCELLED | OUTPATIENT
Start: 2021-11-10

## 2021-11-10 RX ORDER — IPRATROPIUM BROMIDE AND ALBUTEROL SULFATE 2.5; .5 MG/3ML; MG/3ML
3 SOLUTION RESPIRATORY (INHALATION) ONCE AS NEEDED
Status: DISCONTINUED | OUTPATIENT
Start: 2021-11-10 | End: 2021-11-10 | Stop reason: HOSPADM

## 2021-11-10 RX ORDER — MAGNESIUM HYDROXIDE 1200 MG/15ML
LIQUID ORAL AS NEEDED
Status: DISCONTINUED | OUTPATIENT
Start: 2021-11-10 | End: 2021-11-10 | Stop reason: HOSPADM

## 2021-11-10 RX ORDER — HYDROCODONE BITARTRATE AND ACETAMINOPHEN 5; 325 MG/1; MG/1
1 TABLET ORAL ONCE AS NEEDED
Status: CANCELLED | OUTPATIENT
Start: 2021-11-10 | End: 2021-11-20

## 2021-11-10 RX ORDER — HYDROCODONE BITARTRATE AND ACETAMINOPHEN 5; 325 MG/1; MG/1
1 TABLET ORAL ONCE AS NEEDED
Status: DISCONTINUED | OUTPATIENT
Start: 2021-11-10 | End: 2021-11-10 | Stop reason: HOSPADM

## 2021-11-10 RX ORDER — SODIUM CHLORIDE 0.9 % (FLUSH) 0.9 %
3-10 SYRINGE (ML) INJECTION AS NEEDED
Status: CANCELLED | OUTPATIENT
Start: 2021-11-10

## 2021-11-10 RX ORDER — LABETALOL HYDROCHLORIDE 5 MG/ML
5 INJECTION, SOLUTION INTRAVENOUS
Status: DISCONTINUED | OUTPATIENT
Start: 2021-11-10 | End: 2021-11-10 | Stop reason: HOSPADM

## 2021-11-10 RX ORDER — OXYCODONE AND ACETAMINOPHEN 7.5; 325 MG/1; MG/1
1 TABLET ORAL ONCE AS NEEDED
Status: DISCONTINUED | OUTPATIENT
Start: 2021-11-10 | End: 2021-11-10 | Stop reason: HOSPADM

## 2021-11-10 RX ORDER — SODIUM CHLORIDE 9 MG/ML
INJECTION, SOLUTION INTRAVENOUS AS NEEDED
Status: DISCONTINUED | OUTPATIENT
Start: 2021-11-10 | End: 2021-11-10 | Stop reason: HOSPADM

## 2021-11-10 RX ORDER — IPRATROPIUM BROMIDE AND ALBUTEROL SULFATE 2.5; .5 MG/3ML; MG/3ML
3 SOLUTION RESPIRATORY (INHALATION) ONCE AS NEEDED
Status: CANCELLED | OUTPATIENT
Start: 2021-11-10

## 2021-11-10 RX ORDER — FENTANYL CITRATE 50 UG/ML
50 INJECTION, SOLUTION INTRAMUSCULAR; INTRAVENOUS
Status: DISCONTINUED | OUTPATIENT
Start: 2021-11-10 | End: 2021-11-10 | Stop reason: HOSPADM

## 2021-11-10 RX ORDER — MIDAZOLAM HYDROCHLORIDE 1 MG/ML
INJECTION INTRAMUSCULAR; INTRAVENOUS AS NEEDED
Status: DISCONTINUED | OUTPATIENT
Start: 2021-11-10 | End: 2021-11-10 | Stop reason: SURG

## 2021-11-10 RX ORDER — SODIUM CHLORIDE 0.9 % (FLUSH) 0.9 %
3-10 SYRINGE (ML) INJECTION AS NEEDED
Status: DISCONTINUED | OUTPATIENT
Start: 2021-11-10 | End: 2021-11-10 | Stop reason: HOSPADM

## 2021-11-10 RX ORDER — LIDOCAINE HYDROCHLORIDE 10 MG/ML
0.5 INJECTION, SOLUTION EPIDURAL; INFILTRATION; INTRACAUDAL; PERINEURAL ONCE AS NEEDED
Status: COMPLETED | OUTPATIENT
Start: 2021-11-10 | End: 2021-11-10

## 2021-11-10 RX ORDER — NALOXONE HCL 0.4 MG/ML
0.4 VIAL (ML) INJECTION AS NEEDED
Status: DISCONTINUED | OUTPATIENT
Start: 2021-11-10 | End: 2021-11-10 | Stop reason: HOSPADM

## 2021-11-10 RX ORDER — SODIUM CHLORIDE 0.9 % (FLUSH) 0.9 %
10 SYRINGE (ML) INJECTION EVERY 12 HOURS SCHEDULED
Status: DISCONTINUED | OUTPATIENT
Start: 2021-11-10 | End: 2021-11-10 | Stop reason: HOSPADM

## 2021-11-10 RX ORDER — HYDROMORPHONE HYDROCHLORIDE 1 MG/ML
0.5 INJECTION, SOLUTION INTRAMUSCULAR; INTRAVENOUS; SUBCUTANEOUS
Status: DISCONTINUED | OUTPATIENT
Start: 2021-11-10 | End: 2021-11-10 | Stop reason: HOSPADM

## 2021-11-10 RX ORDER — ROCURONIUM BROMIDE 10 MG/ML
INJECTION, SOLUTION INTRAVENOUS AS NEEDED
Status: DISCONTINUED | OUTPATIENT
Start: 2021-11-10 | End: 2021-11-10 | Stop reason: SURG

## 2021-11-10 RX ORDER — FENTANYL CITRATE 50 UG/ML
50 INJECTION, SOLUTION INTRAMUSCULAR; INTRAVENOUS
Status: CANCELLED | OUTPATIENT
Start: 2021-11-10

## 2021-11-10 RX ORDER — SODIUM CHLORIDE, SODIUM LACTATE, POTASSIUM CHLORIDE, CALCIUM CHLORIDE 600; 310; 30; 20 MG/100ML; MG/100ML; MG/100ML; MG/100ML
9 INJECTION, SOLUTION INTRAVENOUS CONTINUOUS
Status: DISCONTINUED | OUTPATIENT
Start: 2021-11-10 | End: 2021-11-10 | Stop reason: HOSPADM

## 2021-11-10 RX ORDER — BUPIVACAINE HYDROCHLORIDE AND EPINEPHRINE 5; 5 MG/ML; UG/ML
INJECTION, SOLUTION PERINEURAL AS NEEDED
Status: DISCONTINUED | OUTPATIENT
Start: 2021-11-10 | End: 2021-11-10 | Stop reason: HOSPADM

## 2021-11-10 RX ORDER — LABETALOL HYDROCHLORIDE 5 MG/ML
5 INJECTION, SOLUTION INTRAVENOUS
Status: CANCELLED | OUTPATIENT
Start: 2021-11-10

## 2021-11-10 RX ORDER — HYDROCODONE BITARTRATE AND ACETAMINOPHEN 5; 325 MG/1; MG/1
1 TABLET ORAL EVERY 4 HOURS PRN
Qty: 18 TABLET | Refills: 0 | Status: SHIPPED | OUTPATIENT
Start: 2021-11-10 | End: 2021-11-13

## 2021-11-10 RX ORDER — SODIUM CHLORIDE 0.9 % (FLUSH) 0.9 %
3 SYRINGE (ML) INJECTION EVERY 12 HOURS SCHEDULED
Status: CANCELLED | OUTPATIENT
Start: 2021-11-10

## 2021-11-10 RX ORDER — DROPERIDOL 2.5 MG/ML
0.62 INJECTION, SOLUTION INTRAMUSCULAR; INTRAVENOUS ONCE AS NEEDED
Status: CANCELLED | OUTPATIENT
Start: 2021-11-10

## 2021-11-10 RX ORDER — DEXAMETHASONE SODIUM PHOSPHATE 10 MG/ML
INJECTION INTRAMUSCULAR; INTRAVENOUS AS NEEDED
Status: DISCONTINUED | OUTPATIENT
Start: 2021-11-10 | End: 2021-11-10 | Stop reason: SURG

## 2021-11-10 RX ORDER — ONDANSETRON 4 MG/1
4 TABLET, FILM COATED ORAL DAILY PRN
Qty: 30 TABLET | Refills: 1 | Status: SHIPPED | OUTPATIENT
Start: 2021-11-10 | End: 2022-11-10

## 2021-11-10 RX ORDER — HYDROMORPHONE HYDROCHLORIDE 1 MG/ML
0.5 INJECTION, SOLUTION INTRAMUSCULAR; INTRAVENOUS; SUBCUTANEOUS
Status: CANCELLED | OUTPATIENT
Start: 2021-11-10

## 2021-11-10 RX ORDER — FENTANYL CITRATE 50 UG/ML
INJECTION, SOLUTION INTRAMUSCULAR; INTRAVENOUS
Status: COMPLETED
Start: 2021-11-10 | End: 2021-11-10

## 2021-11-10 RX ORDER — DROPERIDOL 2.5 MG/ML
0.62 INJECTION, SOLUTION INTRAMUSCULAR; INTRAVENOUS AS NEEDED
Status: DISCONTINUED | OUTPATIENT
Start: 2021-11-10 | End: 2021-11-10 | Stop reason: HOSPADM

## 2021-11-10 RX ORDER — HYDRALAZINE HYDROCHLORIDE 20 MG/ML
5 INJECTION INTRAMUSCULAR; INTRAVENOUS
Status: DISCONTINUED | OUTPATIENT
Start: 2021-11-10 | End: 2021-11-10 | Stop reason: HOSPADM

## 2021-11-10 RX ADMIN — SODIUM CHLORIDE, POTASSIUM CHLORIDE, SODIUM LACTATE AND CALCIUM CHLORIDE: 600; 310; 30; 20 INJECTION, SOLUTION INTRAVENOUS at 10:03

## 2021-11-10 RX ADMIN — ROCURONIUM BROMIDE 50 MG: 10 INJECTION, SOLUTION INTRAVENOUS at 10:08

## 2021-11-10 RX ADMIN — GLYCOPYRROLATE 0.4 MG: 0.2 INJECTION INTRAMUSCULAR; INTRAVENOUS at 11:36

## 2021-11-10 RX ADMIN — MIDAZOLAM HYDROCHLORIDE 2 MG: 1 INJECTION, SOLUTION INTRAMUSCULAR; INTRAVENOUS at 10:03

## 2021-11-10 RX ADMIN — FENTANYL CITRATE 50 MCG: 50 INJECTION, SOLUTION INTRAMUSCULAR; INTRAVENOUS at 12:44

## 2021-11-10 RX ADMIN — SODIUM CHLORIDE, POTASSIUM CHLORIDE, SODIUM LACTATE AND CALCIUM CHLORIDE 9 ML/HR: 600; 310; 30; 20 INJECTION, SOLUTION INTRAVENOUS at 09:28

## 2021-11-10 RX ADMIN — LIDOCAINE HYDROCHLORIDE 50 MG: 10 INJECTION, SOLUTION EPIDURAL; INFILTRATION; INTRACAUDAL; PERINEURAL at 10:08

## 2021-11-10 RX ADMIN — DEXAMETHASONE SODIUM PHOSPHATE 8 MG: 10 INJECTION INTRAMUSCULAR; INTRAVENOUS at 10:27

## 2021-11-10 RX ADMIN — ONDANSETRON 4 MG: 2 INJECTION INTRAMUSCULAR; INTRAVENOUS at 11:26

## 2021-11-10 RX ADMIN — PROPOFOL 150 MG: 10 INJECTION, EMULSION INTRAVENOUS at 10:08

## 2021-11-10 RX ADMIN — FENTANYL CITRATE 100 MCG: 50 INJECTION, SOLUTION INTRAMUSCULAR; INTRAVENOUS at 10:26

## 2021-11-10 RX ADMIN — HYDROCODONE BITARTRATE AND ACETAMINOPHEN 1 TABLET: 5; 325 TABLET ORAL at 13:39

## 2021-11-10 RX ADMIN — LIDOCAINE HYDROCHLORIDE 0.5 ML: 10 INJECTION, SOLUTION EPIDURAL; INFILTRATION; INTRACAUDAL; PERINEURAL at 09:28

## 2021-11-10 RX ADMIN — FAMOTIDINE 20 MG: 20 TABLET ORAL at 09:28

## 2021-11-10 RX ADMIN — ESMOLOL HYDROCHLORIDE 100 MG: 10 INJECTION, SOLUTION INTRAVENOUS at 10:08

## 2021-11-10 RX ADMIN — NEOSTIGMINE METHYLSULFATE 3 MG: 0.5 INJECTION INTRAVENOUS at 11:36

## 2021-11-10 NOTE — ANESTHESIA PROCEDURE NOTES
Airway  Urgency: elective    Date/Time: 11/10/2021 10:10 AM  Airway not difficult    General Information and Staff    Patient location during procedure: OR  Anesthesiologist: Pascual Florez MD  CRNA: Ruddy Hawthorne CRNA    Indications and Patient Condition  Indications for airway management: airway protection    Preoxygenated: yes  MILS not maintained throughout  Mask difficulty assessment: 1 - vent by mask    Final Airway Details  Final airway type: endotracheal airway      Successful airway: ETT  Cuffed: yes   Successful intubation technique: direct laryngoscopy  Endotracheal tube insertion site: oral  Blade: Almeida  Blade size: 2  ETT size (mm): 7.5  Cormack-Lehane Classification: grade I - full view of glottis  Placement verified by: chest auscultation and capnometry   Measured from: lips  ETT/EBT  to lips (cm): 20  Number of attempts at approach: 1  Assessment: lips, teeth, and gum same as pre-op and atraumatic intubation    Additional Comments  Negative epigastric sounds, Breath sound equal bilaterally with symmetric chest rise and fall

## 2021-11-10 NOTE — OP NOTE
Operative Note:    Subjective     Date of Service:  11/10/21  Time of Service:  11:47 EST    Attending:  Surgical asst:                     Surgeon(s) and Role:     * Ana Vargas MD - Primary     * Endy Ellis MD   was responsible for performing the following activities: Retraction, Suction, Irrigation and Suturing and their skilled assistance was necessary for the success of this case.    Ny Mckeon MD       Pre-operative diagnosis(es): Right ovarian mass  Known Uterine leiomyma     Post-operative diagnosis(es): Same  Omental adhesions  Mature teratoma of right ovary   Procedure(s): Laparoscopic right salpingooophorectomy, MANNY   Antibiotics: none ordered on call to OR     Anesthesia: Type: General  ASA:  I     Objective      Operative findings: Large uterus c/w leiomyoma  10 cm right ovarian mass  Omental adhesions to right abdominal wall   Specimens removed: Specimens     ID Source Type Tests Collected By Collected At Frozen?    A Ovary, Right with Fallopian Tube Tissue · TISSUE PATHOLOGY EXAM   Ana Vargas MD 11/10/21 1100     Description: right ovary, right fallopian tube, and right ovarian mass    This specimen was not marked as sent.    B Fallopian Tube, Left Tissue · TISSUE PATHOLOGY EXAM   Ana Vargas MD 11/10/21 1139 No    Description: LEFT FALLOPIAN NODULE    This specimen was not marked as sent.         Fluid Intake: 1000 mL   Output: Documented Output  Est. Blood Loss 20 mL  U     Blood products used: No   Drains: * No LDAs found *   Implant Information:    Complications: None   Intraoperative consult(s):    Condition: stable   Disposition: to PACU then home         Procedure Note:   Patient was taken operating room adequate anesthesia was obtained she was prepped and draped in the usual sterile fashion and legs were placed in Jed stirrups with care to avoid hyperflexion hips or nerve injury.  SCUDs were used for DVT prophylaxis.  A vertical incision was made in umbilicus and the  Optiview trocar was placed on first attempt through the peritoneum and the abdomen insufflated to 15 mmHg there were omental adhesions along the right sidewall that were not entered upon trocar placement.  The uterus was noted to be enlarged consistent with known uterine leiomyoma.  The left lower quadrant 5 mm port was then placed under direct visualization and manipulation of the omental adhesions revealed the 10 cm right ovarian mass that was the indication for the surgery.  At this point a 10 mm trocar was placed in the right lower quadrant under direct visualization.  The harmonic scalpel was used to take down the omental adhesions there was no bowel in close proximity to these omental adhesions.  Once the omental adhesions were taken down the ovary could be easily visualized 2 Endoloops were obtained and placed around the infundibulopelvic ligament.  The fallopian tube was removed with the ovary as well.  The harmonic scalpel was used to transect the infundibulopelvic ligament away from the Endoloop the ovary was too large to place into an Endobag therefore the cystic portion of the ovary was drained of clear fluid.  This may be over small enough to put into an Endobag which was brought through the right lower quadrant 12 mm port and with hemostats the ovary was piecemeal removed revealing a dermoid with significant amount of hair.  Once this ovary was out the left ovary is a suspected it did have a 6 simple appearing cyst however she strongly desired not to be menopausal and this ovary was recently ultrasounded and did not show any abnormal cystic areas within.  A small ectopic portion of ovary was removed from the distal left fallopian tube and sent to pathology with good hemostasis.  The large uterus was inspected and was felt to be amenable to laparoscopic removal in the future should she desire this.  She absolutely did desire the most minimal surgery at this time which was why the uterus was not removed  during today's surgery.  The pedicles were inspected on low pressure the right lower quadrant was closed with a Chris Calvo device.  Abdomen was desufflated and incisions were closed with 3-0 Vicryl the patient recovery awake and stable all sponge and needle counts were correct.              Ana Vargas MD  11/10/21  11:47 EST

## 2021-11-10 NOTE — DISCHARGE INSTRUCTIONS
May take over the counter stool softeners as directed on label to prevent constipation.    As time progresses may take plain tylenol instead of Norco for pain.  No more than 4 grams acetaminophen from ALL sources in 24 period.

## 2021-11-10 NOTE — ANESTHESIA POSTPROCEDURE EVALUATION
Patient: Mayelin Penn    Procedure Summary     Date: 11/10/21 Room / [facility]:  [facility]    Anesthesia Start: 1003 Anesthesia Stop: 1148    Procedure: REMOVAL OF RIGHT OVARIAN MASS, LAPROSCOPIC (N/A Abdomen) Diagnosis:     Surgeons: Ana Vargas MD Provider: Pascual Florez MD    Anesthesia Type: general ASA Status: 1          Anesthesia Type: general    Vitals  No vitals data found for the desired time range.          Post Anesthesia Care and Evaluation    Patient location during evaluation: PACU  Patient participation: complete - patient participated  Level of consciousness: awake and alert  Pain score: 0  Pain management: adequate  Airway patency: patent  Anesthetic complications: No anesthetic complications  PONV Status: none  Cardiovascular status: hemodynamically stable and acceptable  Respiratory status: nonlabored ventilation, acceptable and spontaneous ventilation  Hydration status: acceptable    Comments: VSS  No anesthesia care post op

## 2021-11-11 NOTE — H&P
Pre-Op Visit         Chief Complaint   Patient presents with   • Pre-op Exam         HPI  Mayelin Penn is 46 y.o.  scheduled to have Laparoscopic Right oophrectomy, possible laparotomy at [facility] on 11/10/21 at 10:30am.  Her pre operative diagnosis is   Visit Diagnosis       ICD-10-CM ICD-9-CM   1. Cyst of right ovary  N83.201 620.2   2. Intramural leiomyoma of uterus  D25.1 218.1      . Her last menstrual period was Patient's last menstrual period was 10/28/2021..  Her birth control method is bilateral tubal ligation. Her BMI is Body mass index is 32.28 kg/m²..     She has review the ACOG Pamphlet on Laparoscopy.     She understand the risks of bleeding, infections, possible damage to other organ systems, including but not limited to the gastrointestinal tract and genitourinary tract. She also understands the specific risks listed in the pre op information (video ,pamphlets, etc.)     She has review and signed the pre op consent form.     She has been instructed to have a light dinner the night before surgery, then nothing to eat or drink after midnight.  She is on the following medications:  Encounter Medications          Outpatient Encounter Medications as of 2021   Medication Sig Dispense Refill   • cephalexin (KEFLEX) 500 MG capsule         • metoprolol succinate XL (TOPROL-XL) 25 MG 24 hr tablet Take 25 mg by mouth Daily.       • mupirocin (BACTROBAN) 2 % ointment         • sertraline (ZOLOFT) 50 MG tablet Take 50 mg by mouth Daily.          No facility-administered encounter medications on file as of 2021.               The day of surgery, do not chew gum or smoke. Remove all jewelry, nail polish and contact lens prior to coming to the hospital. Do not bring large sums of money or valuables.  Arrive at the hospital at 8:30 am.  You will talk with the anesthesiologist that morning.  An IV will be started to provide fluids and sedation.  The procedure will take approximately 2  "hour(s).  You will need to have someone drive you home after the surgery.     Pre Admission testing has been scheduled for 11/8/21 at [facility].     She has confirmed that she is not allergic to latex.     The additional following portions of the patient's history were reviewed and updated as appropriate: allergies, current medications, past family history, past medical history, past social history, past surgical history and problem list.     Review of Systems     I have reviewed and agree with the HPI, ROS, and historical information as entered above. Ana Vargas MD        Objective      /92   Ht 167.6 cm (66\")   Wt 90.7 kg (200 lb)   LMP 10/28/2021   BMI 32.28 kg/m²      Physical Exam  Vitals and nursing note reviewed. Exam conducted with a chaperone present.   Constitutional:       Appearance: She is well-developed.   HENT:      Head: Normocephalic and atraumatic.   Cardiovascular:      Rate and Rhythm: Normal rate and regular rhythm.   Pulmonary:      Effort: Pulmonary effort is normal.      Breath sounds: Normal breath sounds.   Abdominal:      General: Bowel sounds are normal.      Palpations: Abdomen is soft. Abdomen is not rigid.   Musculoskeletal:      Cervical back: Normal range of motion. No muscular tenderness.   Skin:     General: Skin is warm and dry.   Neurological:      Mental Status: She is alert and oriented to person, place, and time.   Psychiatric:         Behavior: Behavior normal.                  Assessment/Plan         Assessment           Problem List Items Addressed This Visit                 Genitourinary and Reproductive       Cyst of right ovary - Primary                Other Visit Diagnoses      Intramural leiomyoma of uterus                          Instructions:  1. Discussed all options for management and she would like the most conservative measures to avoid laparotomy and menopause.  We previously discussed all pros and cons regarding hysterectomy versus just removal of the " large right ovary and she opts for removal of the right ovary only unless absolutely necessary.  2. PAT scheduled at [facility]  3. Review with the patient the risk of bleeding, infections, possible damage to other organ systems, including but not limited to the gastrointestinal tract and genitourinary tract.  Reviewed the risk of anesthesia as well as the risk the surgery will not produce the desired results.  Operative permit signed and scanned into the chart.  4. Understands risk of laparotomy and should this cyst turned out to be borderline or malignant the need for full hysterectomy.  She also understands the potential for future hysterectomy leaving behind of fibroids     Ana Vargas MD                       Revision History

## 2021-11-12 LAB
CYTO UR: NORMAL
LAB AP CASE REPORT: NORMAL
LAB AP CLINICAL INFORMATION: NORMAL
LAB AP DIAGNOSIS COMMENT: NORMAL
PATH REPORT.FINAL DX SPEC: NORMAL
PATH REPORT.GROSS SPEC: NORMAL

## 2021-11-15 ENCOUNTER — TELEPHONE (OUTPATIENT)
Dept: OBSTETRICS AND GYNECOLOGY | Facility: CLINIC | Age: 46
End: 2021-11-15

## 2021-11-18 ENCOUNTER — TELEPHONE (OUTPATIENT)
Dept: OBSTETRICS AND GYNECOLOGY | Facility: CLINIC | Age: 46
End: 2021-11-18

## 2021-11-18 NOTE — TELEPHONE ENCOUNTER
Patient called and would like to know if she can use a tampon or if she needs to use a pad, just started period, had procedure last week.

## 2021-11-30 ENCOUNTER — OFFICE VISIT (OUTPATIENT)
Dept: OBSTETRICS AND GYNECOLOGY | Facility: CLINIC | Age: 46
End: 2021-11-30

## 2021-11-30 VITALS
DIASTOLIC BLOOD PRESSURE: 80 MMHG | HEIGHT: 67 IN | WEIGHT: 196 LBS | BODY MASS INDEX: 30.76 KG/M2 | SYSTOLIC BLOOD PRESSURE: 140 MMHG

## 2021-11-30 DIAGNOSIS — Z48.89 POSTOPERATIVE VISIT: Primary | ICD-10-CM

## 2021-11-30 DIAGNOSIS — D21.9 FIBROIDS: ICD-10-CM

## 2021-11-30 PROCEDURE — 99024 POSTOP FOLLOW-UP VISIT: CPT | Performed by: OBSTETRICS & GYNECOLOGY

## 2021-11-30 NOTE — PROGRESS NOTES
"Post-Op Visit    Chief Complaint   Patient presents with   • Post-op       HPI  Mayelin Penn is a 46 y.o. female who returns for a routine post-operative follow-up visit after undergoing Right Oophorectomy  on 11/10/21.      Since the patient was last seen, she reports no problems with eating, bowel movements, voiding, or wound drainage and pain is well controlled.    She states since the procedure, she has shown improvement in their activity level.    The additional following portions of the patient's history were reviewed and updated as appropriate: current medications, past family history, past medical history, past social history, past surgical history and problem list.    Review of Systems   All other systems reviewed and are negative.      I have reviewed and agree with the HPI, ROS, and historical information as entered above. Ana Vargas MD    Objective   /80   Ht 170.2 cm (67\")   Wt 88.9 kg (196 lb)   LMP 11/12/2021 (Exact Date)   Breastfeeding No   BMI 30.70 kg/m²     Physical Exam  Vitals and nursing note reviewed.   Constitutional:       Appearance: She is well-developed.   HENT:      Head: Normocephalic and atraumatic.   Pulmonary:      Effort: Pulmonary effort is normal.   Abdominal:      General: A surgical scar is present.      Palpations: Abdomen is soft. Abdomen is not rigid.      Comments: Clean, Dry, and Intact.  No erythema.    Musculoskeletal:      Cervical back: Normal range of motion.   Neurological:      Mental Status: She is alert and oriented to person, place, and time.   Psychiatric:         Mood and Affect: Mood normal.         Behavior: Behavior normal.         Assessment/Plan     Encounter Diagnosis   Name Primary?   • Postoperative visit Yes       Instructions:  The patient has done well after surgery with no apparent complications. I have discussed the postoperative course to date, as well as, the expected progress going forward.  The patient understands what " complications to be concerned about. I will see the patient in routine follow-up, or sooner if needed.   2.  Dermoid cyst- discussed pathology.    3.  FIbroids.  U/S at time of annual in 6 months.      Ana Vargas MD

## 2022-06-06 ENCOUNTER — TELEPHONE (OUTPATIENT)
Dept: OBSTETRICS AND GYNECOLOGY | Facility: CLINIC | Age: 47
End: 2022-06-06

## 2022-06-06 NOTE — TELEPHONE ENCOUNTER
Mammogram order has been printed out and taken to the front office to fax to [facility] at :   [phone]

## 2022-06-06 NOTE — TELEPHONE ENCOUNTER
PHONE NUMBER FOR [facility] [phone] NEEDS MAMMO SCREENING ORDER FAXED, PT DIDN'T HAVE THAT NUMBER ON HAND. GOES EVERY July TO HAVE THIS DONE AND SEES LOS EVERY October.

## 2022-07-25 ENCOUNTER — TELEPHONE (OUTPATIENT)
Dept: OBSTETRICS AND GYNECOLOGY | Facility: CLINIC | Age: 47
End: 2022-07-25

## 2022-07-25 DIAGNOSIS — Z12.31 ENCOUNTER FOR SCREENING MAMMOGRAM FOR MALIGNANT NEOPLASM OF BREAST: Primary | ICD-10-CM

## 2022-07-25 NOTE — TELEPHONE ENCOUNTER
[facility] called and ask that we send a screening mammogram order over on this patient. They stated it needed to also say with additional views and US if needed. Their fax number is [phone]

## 2023-06-30 PROBLEM — Z01.419 WOMEN'S ANNUAL ROUTINE GYNECOLOGICAL EXAMINATION: Status: ACTIVE | Noted: 2023-06-30

## 2023-06-30 PROBLEM — N92.0 MENORRHAGIA WITH REGULAR CYCLE: Status: ACTIVE | Noted: 2023-06-30

## 2023-07-27 ENCOUNTER — TELEPHONE (OUTPATIENT)
Dept: OBSTETRICS AND GYNECOLOGY | Facility: CLINIC | Age: 48
End: 2023-07-27
Payer: COMMERCIAL

## 2023-07-27 DIAGNOSIS — Z12.31 SCREENING MAMMOGRAM, ENCOUNTER FOR: Primary | ICD-10-CM

## 2023-07-27 NOTE — TELEPHONE ENCOUNTER
Caller: DASHA BROWN    Relationship: [facility]     Best call back number: [phone]    What orders are you requesting (i.e. lab or imaging): ROUTINE MAMMOGRAM SCREENING WITH ADDITIONAL IMAGING IF INDICATED     In what timeframe would the patient need to come in: SCHEDULED 08/02    Where will you receive your lab/imaging services: [facility] WITH DASHA GASPAR     Additional notes: FAX NUMBER [phone]

## 2024-02-12 ENCOUNTER — TELEPHONE (OUTPATIENT)
Dept: OBSTETRICS AND GYNECOLOGY | Facility: CLINIC | Age: 49
End: 2024-02-12
Payer: COMMERCIAL

## 2024-02-13 ENCOUNTER — HOSPITAL ENCOUNTER (OUTPATIENT)
Facility: HOSPITAL | Age: 49
Discharge: HOME | End: 2024-02-13
Attending: EMERGENCY MEDICINE | Admitting: OBSTETRICS & GYNECOLOGY
Payer: COMMERCIAL

## 2024-02-13 ENCOUNTER — APPOINTMENT (OUTPATIENT)
Dept: ULTRASOUND IMAGING | Facility: HOSPITAL | Age: 49
End: 2024-02-13
Payer: COMMERCIAL

## 2024-02-13 VITALS
RESPIRATION RATE: 16 BRPM | DIASTOLIC BLOOD PRESSURE: 67 MMHG | HEART RATE: 79 BPM | SYSTOLIC BLOOD PRESSURE: 106 MMHG | BODY MASS INDEX: 27.31 KG/M2 | HEIGHT: 67 IN | TEMPERATURE: 98.1 F | WEIGHT: 174 LBS | OXYGEN SATURATION: 100 %

## 2024-02-13 DIAGNOSIS — D64.9 SEVERE ANEMIA: ICD-10-CM

## 2024-02-13 DIAGNOSIS — N92.0 MENORRHAGIA WITH REGULAR CYCLE: Primary | ICD-10-CM

## 2024-02-13 LAB
ABO GROUP BLD: NORMAL
ALBUMIN SERPL-MCNC: 3.7 G/DL (ref 3.5–5.2)
ALBUMIN/GLOB SERPL: 2.2 G/DL
ALP SERPL-CCNC: 38 U/L (ref 39–117)
ALT SERPL W P-5'-P-CCNC: 10 U/L (ref 1–33)
ANION GAP SERPL CALCULATED.3IONS-SCNC: 7 MMOL/L (ref 5–15)
AST SERPL-CCNC: 13 U/L (ref 1–32)
B-HCG UR QL: NEGATIVE
BASOPHILS # BLD AUTO: 0.02 10*3/MM3 (ref 0–0.2)
BASOPHILS NFR BLD AUTO: 0.5 % (ref 0–1.5)
BILIRUB SERPL-MCNC: <0.2 MG/DL (ref 0–1.2)
BLD GP AB SCN SERPL QL: NEGATIVE
BUN SERPL-MCNC: 10 MG/DL (ref 6–20)
BUN/CREAT SERPL: 12.3 (ref 7–25)
CALCIUM SPEC-SCNC: 8.1 MG/DL (ref 8.6–10.5)
CHLORIDE SERPL-SCNC: 107 MMOL/L (ref 98–107)
CO2 SERPL-SCNC: 25 MMOL/L (ref 22–29)
CREAT SERPL-MCNC: 0.81 MG/DL (ref 0.57–1)
DEPRECATED RDW RBC AUTO: 49.8 FL (ref 37–54)
EGFRCR SERPLBLD CKD-EPI 2021: 90.8 ML/MIN/1.73
EOSINOPHIL # BLD AUTO: 0.19 10*3/MM3 (ref 0–0.4)
EOSINOPHIL NFR BLD AUTO: 4.8 % (ref 0.3–6.2)
ERYTHROCYTE [DISTWIDTH] IN BLOOD BY AUTOMATED COUNT: 14.3 % (ref 12.3–15.4)
EXPIRATION DATE: NORMAL
GLOBULIN UR ELPH-MCNC: 1.7 GM/DL
GLUCOSE SERPL-MCNC: 105 MG/DL (ref 65–99)
HCT VFR BLD AUTO: 20.5 % (ref 34–46.6)
HCT VFR BLD AUTO: 22.1 % (ref 34–46.6)
HCT VFR BLD AUTO: 28.1 % (ref 34–46.6)
HGB BLD-MCNC: 6.1 G/DL (ref 12–15.9)
HGB BLD-MCNC: 6.8 G/DL (ref 12–15.9)
HGB BLD-MCNC: 9 G/DL (ref 12–15.9)
HOLD SPECIMEN: NORMAL
IMM GRANULOCYTES # BLD AUTO: 0.01 10*3/MM3 (ref 0–0.05)
IMM GRANULOCYTES NFR BLD AUTO: 0.3 % (ref 0–0.5)
INTERNAL NEGATIVE CONTROL: NEGATIVE
INTERNAL POSITIVE CONTROL: POSITIVE
LYMPHOCYTES # BLD AUTO: 1.01 10*3/MM3 (ref 0.7–3.1)
LYMPHOCYTES NFR BLD AUTO: 25.6 % (ref 19.6–45.3)
Lab: NORMAL
MCH RBC QN AUTO: 29.3 PG (ref 26.6–33)
MCHC RBC AUTO-ENTMCNC: 30.8 G/DL (ref 31.5–35.7)
MCV RBC AUTO: 95.3 FL (ref 79–97)
MONOCYTES # BLD AUTO: 0.27 10*3/MM3 (ref 0.1–0.9)
MONOCYTES NFR BLD AUTO: 6.9 % (ref 5–12)
NEUTROPHILS NFR BLD AUTO: 2.44 10*3/MM3 (ref 1.7–7)
NEUTROPHILS NFR BLD AUTO: 61.9 % (ref 42.7–76)
NRBC BLD AUTO-RTO: 0 /100 WBC (ref 0–0.2)
PLATELET # BLD AUTO: 194 10*3/MM3 (ref 140–450)
PMV BLD AUTO: 9.4 FL (ref 6–12)
POTASSIUM SERPL-SCNC: 4.3 MMOL/L (ref 3.5–5.2)
PROT SERPL-MCNC: 5.4 G/DL (ref 6–8.5)
RBC # BLD AUTO: 2.32 10*6/MM3 (ref 3.77–5.28)
RH BLD: POSITIVE
SODIUM SERPL-SCNC: 139 MMOL/L (ref 136–145)
T&S EXPIRATION DATE: NORMAL
WBC NRBC COR # BLD AUTO: 3.94 10*3/MM3 (ref 3.4–10.8)
WHOLE BLOOD HOLD COAG: NORMAL
WHOLE BLOOD HOLD SPECIMEN: NORMAL

## 2024-02-13 PROCEDURE — 85018 HEMOGLOBIN: CPT | Performed by: OBSTETRICS & GYNECOLOGY

## 2024-02-13 PROCEDURE — 76830 TRANSVAGINAL US NON-OB: CPT

## 2024-02-13 PROCEDURE — 96365 THER/PROPH/DIAG IV INF INIT: CPT

## 2024-02-13 PROCEDURE — 86900 BLOOD TYPING SEROLOGIC ABO: CPT | Performed by: EMERGENCY MEDICINE

## 2024-02-13 PROCEDURE — 80053 COMPREHEN METABOLIC PANEL: CPT | Performed by: EMERGENCY MEDICINE

## 2024-02-13 PROCEDURE — 99291 CRITICAL CARE FIRST HOUR: CPT

## 2024-02-13 PROCEDURE — 25810000003 LACTATED RINGERS SOLUTION: Performed by: EMERGENCY MEDICINE

## 2024-02-13 PROCEDURE — 86850 RBC ANTIBODY SCREEN: CPT | Performed by: EMERGENCY MEDICINE

## 2024-02-13 PROCEDURE — 85014 HEMATOCRIT: CPT | Performed by: OBSTETRICS & GYNECOLOGY

## 2024-02-13 PROCEDURE — 85025 COMPLETE CBC W/AUTO DIFF WBC: CPT | Performed by: EMERGENCY MEDICINE

## 2024-02-13 PROCEDURE — 36430 TRANSFUSION BLD/BLD COMPNT: CPT

## 2024-02-13 PROCEDURE — P9016 RBC LEUKOCYTES REDUCED: HCPCS

## 2024-02-13 PROCEDURE — 99214 OFFICE O/P EST MOD 30 MIN: CPT | Performed by: OBSTETRICS & GYNECOLOGY

## 2024-02-13 PROCEDURE — 86900 BLOOD TYPING SEROLOGIC ABO: CPT

## 2024-02-13 PROCEDURE — 86901 BLOOD TYPING SEROLOGIC RH(D): CPT | Performed by: EMERGENCY MEDICINE

## 2024-02-13 PROCEDURE — 86923 COMPATIBILITY TEST ELECTRIC: CPT

## 2024-02-13 PROCEDURE — 85014 HEMATOCRIT: CPT | Performed by: EMERGENCY MEDICINE

## 2024-02-13 PROCEDURE — 85018 HEMOGLOBIN: CPT | Performed by: EMERGENCY MEDICINE

## 2024-02-13 PROCEDURE — 81025 URINE PREGNANCY TEST: CPT | Performed by: EMERGENCY MEDICINE

## 2024-02-13 RX ORDER — MEDROXYPROGESTERONE ACETATE 10 MG/1
10 TABLET ORAL 2 TIMES DAILY
Qty: 60 TABLET | Refills: 1 | Status: SHIPPED | OUTPATIENT
Start: 2024-02-13

## 2024-02-13 RX ORDER — MEDROXYPROGESTERONE ACETATE 10 MG/1
10 TABLET ORAL 2 TIMES DAILY
Status: DISCONTINUED | OUTPATIENT
Start: 2024-02-13 | End: 2024-02-13 | Stop reason: HOSPADM

## 2024-02-13 RX ORDER — SODIUM CHLORIDE 0.9 % (FLUSH) 0.9 %
10 SYRINGE (ML) INJECTION AS NEEDED
Status: DISCONTINUED | OUTPATIENT
Start: 2024-02-13 | End: 2024-02-13 | Stop reason: HOSPADM

## 2024-02-13 RX ORDER — ACETAMINOPHEN 500 MG
1000 TABLET ORAL ONCE
Status: COMPLETED | OUTPATIENT
Start: 2024-02-13 | End: 2024-02-13

## 2024-02-13 RX ADMIN — ACETAMINOPHEN 1000 MG: 500 TABLET ORAL at 06:59

## 2024-02-13 RX ADMIN — TRANEXAMIC ACID 600 MG: 100 INJECTION, SOLUTION INTRAVENOUS at 05:45

## 2024-02-13 RX ADMIN — SODIUM CHLORIDE, POTASSIUM CHLORIDE, SODIUM LACTATE AND CALCIUM CHLORIDE 1000 ML: 600; 310; 30; 20 INJECTION, SOLUTION INTRAVENOUS at 04:31

## 2024-02-14 LAB
BH BB BLOOD EXPIRATION DATE: NORMAL
BH BB BLOOD EXPIRATION DATE: NORMAL
BH BB BLOOD TYPE BARCODE: 2800
BH BB BLOOD TYPE BARCODE: 8400
BH BB DISPENSE STATUS: NORMAL
BH BB DISPENSE STATUS: NORMAL
BH BB PRODUCT CODE: NORMAL
BH BB PRODUCT CODE: NORMAL
BH BB UNIT NUMBER: NORMAL
BH BB UNIT NUMBER: NORMAL
CROSSMATCH INTERPRETATION: NORMAL
CROSSMATCH INTERPRETATION: NORMAL
UNIT  ABO: NORMAL
UNIT  ABO: NORMAL
UNIT  RH: NORMAL
UNIT  RH: NORMAL

## 2024-02-22 ENCOUNTER — OFFICE VISIT (OUTPATIENT)
Dept: OBSTETRICS AND GYNECOLOGY | Facility: CLINIC | Age: 49
End: 2024-02-22
Payer: COMMERCIAL

## 2024-02-22 VITALS
WEIGHT: 177 LBS | SYSTOLIC BLOOD PRESSURE: 112 MMHG | HEIGHT: 67 IN | DIASTOLIC BLOOD PRESSURE: 76 MMHG | BODY MASS INDEX: 27.78 KG/M2

## 2024-02-22 DIAGNOSIS — D25.1 INTRAMURAL LEIOMYOMA OF UTERUS: ICD-10-CM

## 2024-02-22 DIAGNOSIS — N93.9 ABNORMAL UTERINE BLEEDING (AUB): Primary | ICD-10-CM

## 2024-02-22 DIAGNOSIS — Z76.89 ENCOUNTER FOR BIOPSY: ICD-10-CM

## 2024-02-22 RX ORDER — MELATONIN
1000 DAILY
COMMUNITY

## 2024-02-22 RX ORDER — MAGNESIUM OXIDE 400 MG/1
400 TABLET ORAL DAILY
COMMUNITY

## 2024-02-22 NOTE — PROGRESS NOTES
Chief Complaint   Patient presents with    Follow-up       CC:  AUB    Subjective   HPI  Mayelin Penn is a 48 y.o. female, , Patient's last menstrual period was 2024 (exact date). She presents for evaluation of ED follow-up and menorrhagia with irregular cycles every 2-4 weeks. She saturates 1 tampon(s)/pad(s) every 1 hour for 4-5 days. She states that her periods last 7 days. Patient seen in the ED on 2024 for AUB, orthostatic hypotension, and anemia. Patient hemoglobin was 6.1 and hematocrit was 20.5. Patient given TXA and admitted for 2 units of PRBCs. Patient repeat hemoglobin was 9.0 and hematocrit was 28.1. TVUS in ED revealed uterine fibroids and an endometrial lining measuring 32MM. Patient prescribed Provera 10MG BID until follow-up. In the past the patient has tried lysteda without success. The patient reports additional symptoms as none.      US was done today. US revealed 2 uterine fibroids with largest measuring measuring 76.6 x 49.8 x 51.1MM, unable to measure EMC due to shadowing from fibroids, left ovary not visualized from fibroids and overlying bowel gas.       Additional OB/GYN History   Last Pap :   Last Completed Pap Smear            PAP SMEAR (Every 3 Years) Next due on 2023  LIQUID-BASED PAP SMEAR WITH HPV GENOTYPING IF ASCUS ([facility])    2019  Done - negative, HPV negative                      Current Outpatient Medications:     Cholecalciferol 25 MCG (1000 UT) tablet, Take 1 tablet by mouth Daily., Disp: , Rfl:     ferrous sulfate 325 (65 FE) MG tablet, Take 1 tablet by mouth every night at bedtime., Disp: , Rfl:     lamoTRIgine (LaMICtal) 150 MG tablet, Take 200 mg by mouth 2 (Two) Times a Day., Disp: , Rfl:     magnesium oxide (MAG-OX) 400 MG tablet, Take 1 tablet by mouth Daily., Disp: , Rfl:     medroxyPROGESTERone (PROVERA) 10 MG tablet, Take 1 tablet by mouth 2 (Two) Times a Day., Disp: 60 tablet, Rfl: 1    metoprolol  "succinate XL (TOPROL-XL) 25 MG 24 hr tablet, Take 1 tablet by mouth every night at bedtime., Disp: , Rfl:     multivitamin with minerals tablet tablet, Take 1 tablet by mouth Daily. OTC, Disp: , Rfl:     sertraline (ZOLOFT) 50 MG tablet, Take 1 tablet by mouth every night at bedtime., Disp: , Rfl:      Past Medical History:   Diagnosis Date    Abnormal Pap smear of cervix     Anxiety     Bilateral ovarian cysts     Leiomyoma of uterus     Seizures     Dr. Isak Fernandez         Past Surgical History:   Procedure Laterality Date    APPENDECTOMY      CERVICAL BIOPSY  2019    benign, negative for dysplasia or carcinoma    CERVICAL CONIZATION       SECTION       SECTION PRIMARY      DIAGNOSTIC LAPAROSCOPY, SALPINGO OOPHORECTOMY LAPAROSCOPIC N/A 11/10/2021    Procedure: REMOVAL OF RIGHT OVARIAN MASS, LAPROSCOPIC;  Surgeon: Ana Vargas MD;  Location: [facility];  Service: Obstetrics/Gynecology;  Laterality: N/A;    TUBAL ABDOMINAL LIGATION         The additional following portions of the patient's history were reviewed and updated as appropriate: allergies and current medications.    Review of Systems   Constitutional: Negative.    HENT: Negative.     Eyes: Negative.    Respiratory: Negative.     Cardiovascular: Negative.    Gastrointestinal: Negative.    Endocrine: Negative.    Genitourinary:  Positive for menstrual problem.   Musculoskeletal: Negative.    Skin: Negative.    Allergic/Immunologic: Negative.    Neurological: Negative.    Hematological: Negative.    Psychiatric/Behavioral: Negative.         I have reviewed and agree with the HPI, ROS, and historical information as entered above. Ana Vargas MD      Objective   /76   Ht 170.2 cm (67\")   Wt 80.3 kg (177 lb)   LMP 2024 (Exact Date)   BMI 27.72 kg/m²       Assessment & Plan     Assessment     Endometrial Biopsy And removal of Cervical polyp     Indications:NAME@ is a 48 y.o. , who presents today for " endometrial biopsy.  The patient was noted to have Abnormal Uterine Bleeding. Her LMP is Patient's last menstrual period was 02/11/2024 (exact date). After being presented with the risk, benefits, and specific detail of the procedure, the patient wished to proceed.  Written consent was obtained from patient.   Urine pregnancy test was Not indicated. Patient does not have an allergy to betadine or shellfish.     Procedure Details     Time out: immediate members of the procedure team and patient agree to the following: correct patient, correct site, correct procedure to be performed. Ana Vargas MD      The patient was placed on the table in the dorsal lithotomy position.  She was draped in the appropriate manner.  A speculum was placed in the vagina.  The cervix was visualized and prepped with Betadine.  A tenaculum was placed on the anterior lip of the cervix for traction.  A small plastic 5 mm Pipelle syringe curette was inserted into the cervical canal.  The uterus was sounded to 9 cm's.  A vigorous four quadrant biopsy was performed, removing a medium amount of tissue.  The tissue was placed in Formalin and sent to Pathology. The cervical polyp was removed with ring forceps and sent to pathology separately The patient tolerated the procedure very well and she reported moderate cramping.  The tenaculum was removed from the cervix and the speculum was removed.  The patient was observed for 5 minutes.           Complications: none.          Problem List Items Addressed This Visit    None  Visit Diagnoses       Abnormal uterine bleeding (AUB)    -  Primary    Relevant Orders    TISSUE EXAM, [facility] ([facility])    Intramural leiomyoma of uterus        Encounter for biopsy        Relevant Orders    TISSUE EXAM, [facility] ([facility])              Plan     Discussed her recent heavy bleeding, uterine leiomyoma and cervical polyp.  Options discussed regarding pros/cons of ablation versus hysterectomy.  Await bx  results but she is leaning toward hysterectomy.    ENdo bx and removal of cervical polyp done today.      Ana Vargas MD  02/22/2024

## 2024-02-23 LAB — REF LAB TEST METHOD: NORMAL

## 2024-02-26 ENCOUNTER — TELEPHONE (OUTPATIENT)
Dept: OBSTETRICS AND GYNECOLOGY | Facility: CLINIC | Age: 49
End: 2024-02-26
Payer: COMMERCIAL

## 2024-02-26 LAB — REF LAB TEST METHOD: NORMAL

## 2024-02-26 NOTE — TELEPHONE ENCOUNTER
Caller: Mayeiln Penn    Relationship: Self    Best call back number: [phone]    Caller requesting test results: PATIENT    What test was performed: TISSUE EXAM    When was the test performed: 2/22/24    Where was the test performed: [facility]     Additional notes: PATIENT CALLED TO CHECK STATUS OF TEST RESULTS

## 2024-02-26 NOTE — TELEPHONE ENCOUNTER
Returned patient's call. She is asking about biopsy results from 2/22/24. Informed her that Dr. Vargas is not in the office today to review results; she will be here tomorrow. Informed patient that when Dr. Vargas does review the results she will have someone contact patient. She v/u and agreed.

## 2024-02-28 ENCOUNTER — TELEPHONE (OUTPATIENT)
Dept: OBSTETRICS AND GYNECOLOGY | Facility: CLINIC | Age: 49
End: 2024-02-28
Payer: COMMERCIAL

## 2024-02-28 NOTE — TELEPHONE ENCOUNTER
"Returned patient's call. Informed her of Dr. Vargas' result note: \"Call with results.  Mainly polyp in samples.  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 Will check with Dr. Vargas and follow up with patient. She v/u.   Discussed with Dr. Vargas. She stated it is okay to wait until late May or early June to schedule hysterectomy and she will have our surgery scheduler contact patient. Informed patient; she v/u and agreed.   "

## 2024-03-12 ENCOUNTER — TELEPHONE (OUTPATIENT)
Dept: OBSTETRICS AND GYNECOLOGY | Facility: CLINIC | Age: 49
End: 2024-03-12
Payer: COMMERCIAL

## 2024-03-12 NOTE — TELEPHONE ENCOUNTER
PT states she talked to Dr Vargas about hysterectomy. Would like to schedule hysterectomy after May 18, 2024. Message sent to schedulers, Carolyn, and Dr Vargas to schedule. Still bleeding heavy with Provera 10mg BID. Not severe bleeding. Small clots. Advised to take  Q6hr or 800mg Q8hr PRN for heavy bleeding. PT VU>

## 2024-03-12 NOTE — TELEPHONE ENCOUNTER
PT CALLING [facility] SHE IS CURRENTLY PRESCRIBED MEDS FOR AUB. PT IS SEEING INCREASED BLEEDING THOUGHT SHE IS NOT SOAKING THROUGH MORE THAN 1 PAD PER HR. PT STATES SHE HAD DISCUSSED A HYSTERECTOMY WITH DR EAGLE AND ISN'T SURE IF THIS IS STILL THE PLAN AND IF IT CAN BE SCHEDULED.

## 2024-04-08 RX ORDER — MEDROXYPROGESTERONE ACETATE 10 MG/1
10 TABLET ORAL 2 TIMES DAILY
Qty: 60 TABLET | Refills: 0 | Status: SHIPPED | OUTPATIENT
Start: 2024-04-08

## 2024-05-08 ENCOUNTER — TELEPHONE (OUTPATIENT)
Dept: OBSTETRICS AND GYNECOLOGY | Facility: CLINIC | Age: 49
End: 2024-05-08

## 2024-05-08 DIAGNOSIS — N92.1 MENORRHAGIA WITH IRREGULAR CYCLE: Primary | ICD-10-CM

## 2024-05-08 RX ORDER — TRANEXAMIC ACID 650 MG/1
TABLET ORAL
Qty: 30 TABLET | Refills: 2 | Status: SHIPPED | OUTPATIENT
Start: 2024-05-08

## 2024-05-08 NOTE — TELEPHONE ENCOUNTER
LOS pt  Last OV 02/22/24. Patient was seen for AUB. Patient has fibroids and a cervical polyp. An EMB was performed.    Patient is scheduled for a TLH/BS or possible TROY/BS on 07/12/24    Patient calling for heavy bleeding with clots. Patient has been taking Provera 10 MG BID and Ibuprofen. Patient states she has been bleeding through a pad and a tampon every 45 minutes for the past 2 nights. Patient states that the Provera was helping initially, but isn't anymore. Patient informed that I would speak with a provider and call her back. Pt v/u.    Per AM, stop Provera and Rx for Lysteda sent to pt's pharmacy. Pt is to take 2 tablets 3 times a day for 5 days at the start of her period. Patient should not take more than once a month.    Patient informed of AM recommendations and instructed to call back with any problems or continued bleeding. Patient v/u and requested that I send a message of instructions through [employer]. Message sent.

## 2024-05-09 ENCOUNTER — HOSPITAL ENCOUNTER (OUTPATIENT)
Facility: HOSPITAL | Age: 49
Setting detail: OBSERVATION
Discharge: HOME | End: 2024-05-10
Attending: EMERGENCY MEDICINE | Admitting: OBSTETRICS & GYNECOLOGY
Payer: COMMERCIAL

## 2024-05-09 ENCOUNTER — ANESTHESIA EVENT (OUTPATIENT)
Dept: PERIOP | Facility: HOSPITAL | Age: 49
End: 2024-05-09
Payer: COMMERCIAL

## 2024-05-09 ENCOUNTER — ANESTHESIA (OUTPATIENT)
Dept: PERIOP | Facility: HOSPITAL | Age: 49
End: 2024-05-09
Payer: COMMERCIAL

## 2024-05-09 DIAGNOSIS — D25.9 FIBROID, UTERINE: ICD-10-CM

## 2024-05-09 DIAGNOSIS — N93.9 ABNORMAL UTERINE BLEEDING (AUB): ICD-10-CM

## 2024-05-09 DIAGNOSIS — Z90.710 STATUS POST LAPAROSCOPIC HYSTERECTOMY: ICD-10-CM

## 2024-05-09 DIAGNOSIS — D50.0 BLOOD LOSS ANEMIA: ICD-10-CM

## 2024-05-09 DIAGNOSIS — N92.1 MENORRHAGIA WITH IRREGULAR CYCLE: Primary | ICD-10-CM

## 2024-05-09 DIAGNOSIS — N92.0 MENORRHAGIA: ICD-10-CM

## 2024-05-09 DIAGNOSIS — D25.1 INTRAMURAL AND SUBMUCOUS LEIOMYOMA OF UTERUS: ICD-10-CM

## 2024-05-09 DIAGNOSIS — D25.0 INTRAMURAL AND SUBMUCOUS LEIOMYOMA OF UTERUS: ICD-10-CM

## 2024-05-09 PROBLEM — D14.30: Status: ACTIVE | Noted: 2024-05-09

## 2024-05-09 LAB
ABO GROUP BLD: NORMAL
ALBUMIN SERPL-MCNC: 3.6 G/DL (ref 3.5–5.2)
ALBUMIN SERPL-MCNC: 3.7 G/DL (ref 3.5–5.2)
ALBUMIN/GLOB SERPL: 1.8 G/DL
ALBUMIN/GLOB SERPL: 2.1 G/DL
ALP SERPL-CCNC: 35 U/L (ref 39–117)
ALP SERPL-CCNC: 38 U/L (ref 39–117)
ALT SERPL W P-5'-P-CCNC: 8 U/L (ref 1–33)
ALT SERPL W P-5'-P-CCNC: 9 U/L (ref 1–33)
ANION GAP SERPL CALCULATED.3IONS-SCNC: 6 MMOL/L (ref 5–15)
ANION GAP SERPL CALCULATED.3IONS-SCNC: 8 MMOL/L (ref 5–15)
APTT PPP: 27.5 SECONDS (ref 22–39)
AST SERPL-CCNC: 12 U/L (ref 1–32)
AST SERPL-CCNC: 13 U/L (ref 1–32)
B-HCG UR QL: NEGATIVE
BASOPHILS # BLD AUTO: 0.03 10*3/MM3 (ref 0–0.2)
BASOPHILS # BLD AUTO: 0.04 10*3/MM3 (ref 0–0.2)
BASOPHILS NFR BLD AUTO: 0.5 % (ref 0–1.5)
BASOPHILS NFR BLD AUTO: 0.6 % (ref 0–1.5)
BILIRUB SERPL-MCNC: 0.2 MG/DL (ref 0–1.2)
BILIRUB SERPL-MCNC: 0.7 MG/DL (ref 0–1.2)
BLD GP AB SCN SERPL QL: NEGATIVE
BUN BLDA-MCNC: 11 MG/DL (ref 8–26)
BUN SERPL-MCNC: 10 MG/DL (ref 6–20)
BUN SERPL-MCNC: 11 MG/DL (ref 6–20)
BUN/CREAT SERPL: 11.5 (ref 7–25)
BUN/CREAT SERPL: 11.7 (ref 7–25)
CA-I BLDA-SCNC: 1.24 MMOL/L (ref 1.2–1.32)
CALCIUM SPEC-SCNC: 8.6 MG/DL (ref 8.6–10.5)
CALCIUM SPEC-SCNC: 8.8 MG/DL (ref 8.6–10.5)
CHLORIDE BLDA-SCNC: 101 MMOL/L (ref 98–109)
CHLORIDE SERPL-SCNC: 106 MMOL/L (ref 98–107)
CHLORIDE SERPL-SCNC: 108 MMOL/L (ref 98–107)
CO2 BLDA-SCNC: 25 MMOL/L (ref 24–29)
CO2 SERPL-SCNC: 25 MMOL/L (ref 22–29)
CO2 SERPL-SCNC: 28 MMOL/L (ref 22–29)
CREAT BLDA-MCNC: 1 MG/DL (ref 0.6–1.3)
CREAT SERPL-MCNC: 0.87 MG/DL (ref 0.57–1)
CREAT SERPL-MCNC: 0.94 MG/DL (ref 0.57–1)
DEPRECATED RDW RBC AUTO: 49.1 FL (ref 37–54)
DEPRECATED RDW RBC AUTO: 50.2 FL (ref 37–54)
DEPRECATED RDW RBC AUTO: 50.9 FL (ref 37–54)
EGFRCR SERPLBLD CKD-EPI 2021: 70.5 ML/MIN/1.73
EGFRCR SERPLBLD CKD-EPI 2021: 75.9 ML/MIN/1.73
EGFRCR SERPLBLD CKD-EPI 2021: 83.3 ML/MIN/1.73
EOSINOPHIL # BLD AUTO: 0.09 10*3/MM3 (ref 0–0.4)
EOSINOPHIL # BLD AUTO: 0.19 10*3/MM3 (ref 0–0.4)
EOSINOPHIL NFR BLD AUTO: 1.4 % (ref 0.3–6.2)
EOSINOPHIL NFR BLD AUTO: 3.3 % (ref 0.3–6.2)
ERYTHROCYTE [DISTWIDTH] IN BLOOD BY AUTOMATED COUNT: 14.5 % (ref 12.3–15.4)
ERYTHROCYTE [DISTWIDTH] IN BLOOD BY AUTOMATED COUNT: 14.8 % (ref 12.3–15.4)
ERYTHROCYTE [DISTWIDTH] IN BLOOD BY AUTOMATED COUNT: 15.4 % (ref 12.3–15.4)
EXPIRATION DATE: NORMAL
FIBRINOGEN PPP-MCNC: 200 MG/DL (ref 203–470)
GLOBULIN UR ELPH-MCNC: 1.7 GM/DL
GLOBULIN UR ELPH-MCNC: 2.1 GM/DL
GLUCOSE BLDC GLUCOMTR-MCNC: 107 MG/DL (ref 70–130)
GLUCOSE SERPL-MCNC: 105 MG/DL (ref 65–99)
GLUCOSE SERPL-MCNC: 121 MG/DL (ref 65–99)
HCT VFR BLD AUTO: 25.7 % (ref 34–46.6)
HCT VFR BLD AUTO: 26.9 % (ref 34–46.6)
HCT VFR BLD AUTO: 27.6 % (ref 34–46.6)
HCT VFR BLDA CALC: 20 % (ref 38–51)
HGB BLD-MCNC: 7.8 G/DL (ref 12–15.9)
HGB BLD-MCNC: 8.3 G/DL (ref 12–15.9)
HGB BLD-MCNC: 8.6 G/DL (ref 12–15.9)
HGB BLDA-MCNC: 6.8 G/DL (ref 12–17)
HOLD SPECIMEN: NORMAL
IMM GRANULOCYTES # BLD AUTO: 0.01 10*3/MM3 (ref 0–0.05)
IMM GRANULOCYTES # BLD AUTO: 0.02 10*3/MM3 (ref 0–0.05)
IMM GRANULOCYTES NFR BLD AUTO: 0.2 % (ref 0–0.5)
IMM GRANULOCYTES NFR BLD AUTO: 0.3 % (ref 0–0.5)
INR PPP: 1.13 (ref 0.89–1.12)
INTERNAL NEGATIVE CONTROL: NORMAL
INTERNAL POSITIVE CONTROL: NORMAL
LYMPHOCYTES # BLD AUTO: 1.36 10*3/MM3 (ref 0.7–3.1)
LYMPHOCYTES # BLD AUTO: 1.5 10*3/MM3 (ref 0.7–3.1)
LYMPHOCYTES NFR BLD AUTO: 21.1 % (ref 19.6–45.3)
LYMPHOCYTES NFR BLD AUTO: 25.9 % (ref 19.6–45.3)
Lab: NORMAL
MCH RBC QN AUTO: 28.4 PG (ref 26.6–33)
MCH RBC QN AUTO: 28.5 PG (ref 26.6–33)
MCH RBC QN AUTO: 28.8 PG (ref 26.6–33)
MCHC RBC AUTO-ENTMCNC: 30.4 G/DL (ref 31.5–35.7)
MCHC RBC AUTO-ENTMCNC: 30.9 G/DL (ref 31.5–35.7)
MCHC RBC AUTO-ENTMCNC: 31.2 G/DL (ref 31.5–35.7)
MCV RBC AUTO: 92.3 FL (ref 79–97)
MCV RBC AUTO: 92.4 FL (ref 79–97)
MCV RBC AUTO: 93.5 FL (ref 79–97)
MONOCYTES # BLD AUTO: 0.34 10*3/MM3 (ref 0.1–0.9)
MONOCYTES # BLD AUTO: 0.37 10*3/MM3 (ref 0.1–0.9)
MONOCYTES NFR BLD AUTO: 5.3 % (ref 5–12)
MONOCYTES NFR BLD AUTO: 6.4 % (ref 5–12)
NEUTROPHILS NFR BLD AUTO: 3.69 10*3/MM3 (ref 1.7–7)
NEUTROPHILS NFR BLD AUTO: 4.59 10*3/MM3 (ref 1.7–7)
NEUTROPHILS NFR BLD AUTO: 63.7 % (ref 42.7–76)
NEUTROPHILS NFR BLD AUTO: 71.3 % (ref 42.7–76)
NRBC BLD AUTO-RTO: 0 /100 WBC (ref 0–0.2)
NRBC BLD AUTO-RTO: 0 /100 WBC (ref 0–0.2)
PLAT MORPH BLD: NORMAL
PLATELET # BLD AUTO: 194 10*3/MM3 (ref 140–450)
PLATELET # BLD AUTO: 212 10*3/MM3 (ref 140–450)
PLATELET # BLD AUTO: 237 10*3/MM3 (ref 140–450)
PMV BLD AUTO: 10.3 FL (ref 6–12)
PMV BLD AUTO: 9.7 FL (ref 6–12)
PMV BLD AUTO: 9.8 FL (ref 6–12)
POTASSIUM BLDA-SCNC: 4.2 MMOL/L (ref 3.5–4.9)
POTASSIUM SERPL-SCNC: 4.2 MMOL/L (ref 3.5–5.2)
POTASSIUM SERPL-SCNC: 4.3 MMOL/L (ref 3.5–5.2)
PROT SERPL-MCNC: 5.3 G/DL (ref 6–8.5)
PROT SERPL-MCNC: 5.8 G/DL (ref 6–8.5)
PROTHROMBIN TIME: 14.6 SECONDS (ref 12.2–14.5)
RBC # BLD AUTO: 2.75 10*6/MM3 (ref 3.77–5.28)
RBC # BLD AUTO: 2.91 10*6/MM3 (ref 3.77–5.28)
RBC # BLD AUTO: 2.99 10*6/MM3 (ref 3.77–5.28)
RBC MORPH BLD: NORMAL
RH BLD: POSITIVE
SODIUM BLD-SCNC: 139 MMOL/L (ref 138–146)
SODIUM SERPL-SCNC: 140 MMOL/L (ref 136–145)
SODIUM SERPL-SCNC: 141 MMOL/L (ref 136–145)
T&S EXPIRATION DATE: NORMAL
WBC MORPH BLD: NORMAL
WBC NRBC COR # BLD AUTO: 12.72 10*3/MM3 (ref 3.4–10.8)
WBC NRBC COR # BLD AUTO: 5.79 10*3/MM3 (ref 3.4–10.8)
WBC NRBC COR # BLD AUTO: 6.44 10*3/MM3 (ref 3.4–10.8)
WHOLE BLOOD HOLD COAG: NORMAL
WHOLE BLOOD HOLD SPECIMEN: NORMAL

## 2024-05-09 PROCEDURE — 85610 PROTHROMBIN TIME: CPT | Performed by: OBSTETRICS & GYNECOLOGY

## 2024-05-09 PROCEDURE — 85025 COMPLETE CBC W/AUTO DIFF WBC: CPT | Performed by: OBSTETRICS & GYNECOLOGY

## 2024-05-09 PROCEDURE — 86923 COMPATIBILITY TEST ELECTRIC: CPT

## 2024-05-09 PROCEDURE — 25010000002 SUGAMMADEX 200 MG/2ML SOLUTION: Performed by: NURSE ANESTHETIST, CERTIFIED REGISTERED

## 2024-05-09 PROCEDURE — 88305 TISSUE EXAM BY PATHOLOGIST: CPT | Performed by: OBSTETRICS & GYNECOLOGY

## 2024-05-09 PROCEDURE — 86901 BLOOD TYPING SEROLOGIC RH(D): CPT | Performed by: EMERGENCY MEDICINE

## 2024-05-09 PROCEDURE — G0378 HOSPITAL OBSERVATION PER HR: HCPCS

## 2024-05-09 PROCEDURE — 96360 HYDRATION IV INFUSION INIT: CPT

## 2024-05-09 PROCEDURE — 25010000002 GLYCOPYRROLATE 1 MG/5ML SOLUTION: Performed by: NURSE ANESTHETIST, CERTIFIED REGISTERED

## 2024-05-09 PROCEDURE — 25810000003 SODIUM CHLORIDE 0.9 % SOLUTION: Performed by: OBSTETRICS & GYNECOLOGY

## 2024-05-09 PROCEDURE — 86900 BLOOD TYPING SEROLOGIC ABO: CPT | Performed by: EMERGENCY MEDICINE

## 2024-05-09 PROCEDURE — 25010000002 CEFAZOLIN PER 500 MG: Performed by: OBSTETRICS & GYNECOLOGY

## 2024-05-09 PROCEDURE — 99291 CRITICAL CARE FIRST HOUR: CPT

## 2024-05-09 PROCEDURE — 99285 EMERGENCY DEPT VISIT HI MDM: CPT

## 2024-05-09 PROCEDURE — 25010000002 MIDAZOLAM PER 1 MG: Performed by: NURSE ANESTHETIST, CERTIFIED REGISTERED

## 2024-05-09 PROCEDURE — 25010000002 FENTANYL CITRATE (PF) 100 MCG/2ML SOLUTION: Performed by: NURSE ANESTHETIST, CERTIFIED REGISTERED

## 2024-05-09 PROCEDURE — 81025 URINE PREGNANCY TEST: CPT | Performed by: ANESTHESIOLOGY

## 2024-05-09 PROCEDURE — 85384 FIBRINOGEN ACTIVITY: CPT | Performed by: OBSTETRICS & GYNECOLOGY

## 2024-05-09 PROCEDURE — 80053 COMPREHEN METABOLIC PANEL: CPT | Performed by: PHYSICIAN ASSISTANT

## 2024-05-09 PROCEDURE — 86900 BLOOD TYPING SEROLOGIC ABO: CPT

## 2024-05-09 PROCEDURE — 25010000002 DEXAMETHASONE PER 1 MG: Performed by: NURSE ANESTHETIST, CERTIFIED REGISTERED

## 2024-05-09 PROCEDURE — 36415 COLL VENOUS BLD VENIPUNCTURE: CPT

## 2024-05-09 PROCEDURE — 85007 BL SMEAR W/DIFF WBC COUNT: CPT

## 2024-05-09 PROCEDURE — 25810000003 LACTATED RINGERS PER 1000 ML: Performed by: OBSTETRICS & GYNECOLOGY

## 2024-05-09 PROCEDURE — 25010000002 ONDANSETRON PER 1 MG: Performed by: NURSE ANESTHETIST, CERTIFIED REGISTERED

## 2024-05-09 PROCEDURE — 25010000002 CEFOXITIN PER 1 G: Performed by: OBSTETRICS & GYNECOLOGY

## 2024-05-09 PROCEDURE — 25010000002 KETOROLAC TROMETHAMINE PER 15 MG: Performed by: OBSTETRICS & GYNECOLOGY

## 2024-05-09 PROCEDURE — 85014 HEMATOCRIT: CPT | Performed by: PHYSICIAN ASSISTANT

## 2024-05-09 PROCEDURE — 85025 COMPLETE CBC W/AUTO DIFF WBC: CPT

## 2024-05-09 PROCEDURE — 96361 HYDRATE IV INFUSION ADD-ON: CPT

## 2024-05-09 PROCEDURE — 85027 COMPLETE CBC AUTOMATED: CPT | Performed by: OBSTETRICS & GYNECOLOGY

## 2024-05-09 PROCEDURE — 36430 TRANSFUSION BLD/BLD COMPNT: CPT

## 2024-05-09 PROCEDURE — P9016 RBC LEUKOCYTES REDUCED: HCPCS

## 2024-05-09 PROCEDURE — 25010000002 PROPOFOL 10 MG/ML EMULSION: Performed by: NURSE ANESTHETIST, CERTIFIED REGISTERED

## 2024-05-09 PROCEDURE — C1765 ADHESION BARRIER: HCPCS | Performed by: OBSTETRICS & GYNECOLOGY

## 2024-05-09 PROCEDURE — 80047 BASIC METABLC PNL IONIZED CA: CPT | Performed by: PHYSICIAN ASSISTANT

## 2024-05-09 PROCEDURE — 85730 THROMBOPLASTIN TIME PARTIAL: CPT | Performed by: OBSTETRICS & GYNECOLOGY

## 2024-05-09 PROCEDURE — 86850 RBC ANTIBODY SCREEN: CPT | Performed by: EMERGENCY MEDICINE

## 2024-05-09 PROCEDURE — 80053 COMPREHEN METABOLIC PANEL: CPT | Performed by: OBSTETRICS & GYNECOLOGY

## 2024-05-09 DEVICE — ABSORBABLE ADHESION BARRIER
Type: IMPLANTABLE DEVICE | Site: PELVIS | Status: FUNCTIONAL
Brand: GYNECARE INTERCEED

## 2024-05-09 RX ORDER — LAMOTRIGINE 100 MG/1
300 TABLET ORAL 2 TIMES DAILY
Status: DISCONTINUED | OUTPATIENT
Start: 2024-05-09 | End: 2024-05-10 | Stop reason: HOSPADM

## 2024-05-09 RX ORDER — MELOXICAM 7.5 MG/1
7.5 TABLET ORAL DAILY
Status: DISCONTINUED | OUTPATIENT
Start: 2024-05-10 | End: 2024-05-10 | Stop reason: HOSPADM

## 2024-05-09 RX ORDER — SCOLOPAMINE TRANSDERMAL SYSTEM 1 MG/1
1 PATCH, EXTENDED RELEASE TRANSDERMAL CONTINUOUS
Status: DISCONTINUED | OUTPATIENT
Start: 2024-05-09 | End: 2024-05-09

## 2024-05-09 RX ORDER — PROPOFOL 10 MG/ML
VIAL (ML) INTRAVENOUS AS NEEDED
Status: DISCONTINUED | OUTPATIENT
Start: 2024-05-09 | End: 2024-05-09 | Stop reason: SURG

## 2024-05-09 RX ORDER — ESTRADIOL 0.05 MG/D
1 PATCH TRANSDERMAL WEEKLY
Status: DISCONTINUED | OUTPATIENT
Start: 2024-05-09 | End: 2024-05-10 | Stop reason: HOSPADM

## 2024-05-09 RX ORDER — DEXMEDETOMIDINE HYDROCHLORIDE 4 UG/ML
INJECTION, SOLUTION INTRAVENOUS AS NEEDED
Status: DISCONTINUED | OUTPATIENT
Start: 2024-05-09 | End: 2024-05-09 | Stop reason: SURG

## 2024-05-09 RX ORDER — ONDANSETRON 2 MG/ML
INJECTION INTRAMUSCULAR; INTRAVENOUS AS NEEDED
Status: DISCONTINUED | OUTPATIENT
Start: 2024-05-09 | End: 2024-05-09 | Stop reason: SURG

## 2024-05-09 RX ORDER — SODIUM CHLORIDE 0.9 % (FLUSH) 0.9 %
10 SYRINGE (ML) INJECTION EVERY 12 HOURS SCHEDULED
Status: DISCONTINUED | OUTPATIENT
Start: 2024-05-09 | End: 2024-05-09

## 2024-05-09 RX ORDER — DROPERIDOL 2.5 MG/ML
0.62 INJECTION, SOLUTION INTRAMUSCULAR; INTRAVENOUS ONCE AS NEEDED
Status: DISCONTINUED | OUTPATIENT
Start: 2024-05-09 | End: 2024-05-09 | Stop reason: HOSPADM

## 2024-05-09 RX ORDER — GABAPENTIN 300 MG/1
600 CAPSULE ORAL ONCE
Status: COMPLETED | OUTPATIENT
Start: 2024-05-09 | End: 2024-05-09

## 2024-05-09 RX ORDER — OXYCODONE HYDROCHLORIDE 5 MG/1
5 TABLET ORAL EVERY 4 HOURS PRN
Status: DISCONTINUED | OUTPATIENT
Start: 2024-05-09 | End: 2024-05-10 | Stop reason: HOSPADM

## 2024-05-09 RX ORDER — FAMOTIDINE 20 MG/1
20 TABLET, FILM COATED ORAL ONCE
Status: COMPLETED | OUTPATIENT
Start: 2024-05-09 | End: 2024-05-09

## 2024-05-09 RX ORDER — FENTANYL CITRATE 50 UG/ML
INJECTION, SOLUTION INTRAMUSCULAR; INTRAVENOUS AS NEEDED
Status: DISCONTINUED | OUTPATIENT
Start: 2024-05-09 | End: 2024-05-09 | Stop reason: SURG

## 2024-05-09 RX ORDER — ACETAMINOPHEN 500 MG
1000 TABLET ORAL ONCE
Status: COMPLETED | OUTPATIENT
Start: 2024-05-09 | End: 2024-05-09

## 2024-05-09 RX ORDER — DOCUSATE SODIUM 100 MG/1
100 CAPSULE, LIQUID FILLED ORAL 2 TIMES DAILY PRN
Status: DISCONTINUED | OUTPATIENT
Start: 2024-05-09 | End: 2024-05-10 | Stop reason: HOSPADM

## 2024-05-09 RX ORDER — GABAPENTIN 100 MG/1
100 CAPSULE ORAL 3 TIMES DAILY
Status: DISCONTINUED | OUTPATIENT
Start: 2024-05-09 | End: 2024-05-10 | Stop reason: HOSPADM

## 2024-05-09 RX ORDER — DEXAMETHASONE SODIUM PHOSPHATE 4 MG/ML
INJECTION, SOLUTION INTRA-ARTICULAR; INTRALESIONAL; INTRAMUSCULAR; INTRAVENOUS; SOFT TISSUE AS NEEDED
Status: DISCONTINUED | OUTPATIENT
Start: 2024-05-09 | End: 2024-05-09 | Stop reason: SURG

## 2024-05-09 RX ORDER — METOPROLOL SUCCINATE 25 MG/1
25 TABLET, EXTENDED RELEASE ORAL NIGHTLY
Status: DISCONTINUED | OUTPATIENT
Start: 2024-05-09 | End: 2024-05-10 | Stop reason: HOSPADM

## 2024-05-09 RX ORDER — SIMETHICONE 80 MG
80 TABLET,CHEWABLE ORAL 4 TIMES DAILY PRN
Status: DISCONTINUED | OUTPATIENT
Start: 2024-05-09 | End: 2024-05-10 | Stop reason: HOSPADM

## 2024-05-09 RX ORDER — SODIUM CHLORIDE 9 MG/ML
40 INJECTION, SOLUTION INTRAVENOUS AS NEEDED
Status: DISCONTINUED | OUTPATIENT
Start: 2024-05-09 | End: 2024-05-09

## 2024-05-09 RX ORDER — SODIUM CHLORIDE 0.9 % (FLUSH) 0.9 %
10 SYRINGE (ML) INJECTION AS NEEDED
Status: DISCONTINUED | OUTPATIENT
Start: 2024-05-09 | End: 2024-05-09

## 2024-05-09 RX ORDER — LIDOCAINE HYDROCHLORIDE 10 MG/ML
INJECTION, SOLUTION EPIDURAL; INFILTRATION; INTRACAUDAL; PERINEURAL AS NEEDED
Status: DISCONTINUED | OUTPATIENT
Start: 2024-05-09 | End: 2024-05-09 | Stop reason: SURG

## 2024-05-09 RX ORDER — SODIUM CHLORIDE 9 MG/ML
125 INJECTION, SOLUTION INTRAVENOUS CONTINUOUS
Status: DISCONTINUED | OUTPATIENT
Start: 2024-05-09 | End: 2024-05-10 | Stop reason: HOSPADM

## 2024-05-09 RX ORDER — TRANEXAMIC ACID 10 MG/ML
1000 INJECTION, SOLUTION INTRAVENOUS ONCE
Status: COMPLETED | OUTPATIENT
Start: 2024-05-09 | End: 2024-05-09

## 2024-05-09 RX ORDER — DIPHENHYDRAMINE HYDROCHLORIDE 50 MG/ML
25 INJECTION INTRAMUSCULAR; INTRAVENOUS NIGHTLY PRN
Status: DISCONTINUED | OUTPATIENT
Start: 2024-05-09 | End: 2024-05-10 | Stop reason: HOSPADM

## 2024-05-09 RX ORDER — SODIUM CHLORIDE, SODIUM LACTATE, POTASSIUM CHLORIDE, CALCIUM CHLORIDE 600; 310; 30; 20 MG/100ML; MG/100ML; MG/100ML; MG/100ML
125 INJECTION, SOLUTION INTRAVENOUS CONTINUOUS
Status: DISCONTINUED | OUTPATIENT
Start: 2024-05-09 | End: 2024-05-09

## 2024-05-09 RX ORDER — BUPIVACAINE HYDROCHLORIDE AND EPINEPHRINE 5; 5 MG/ML; UG/ML
INJECTION, SOLUTION PERINEURAL AS NEEDED
Status: DISCONTINUED | OUTPATIENT
Start: 2024-05-09 | End: 2024-05-09 | Stop reason: HOSPADM

## 2024-05-09 RX ORDER — HYDROMORPHONE HYDROCHLORIDE 1 MG/ML
0.5 INJECTION, SOLUTION INTRAMUSCULAR; INTRAVENOUS; SUBCUTANEOUS
Status: DISCONTINUED | OUTPATIENT
Start: 2024-05-09 | End: 2024-05-09 | Stop reason: HOSPADM

## 2024-05-09 RX ORDER — MAGNESIUM HYDROXIDE 1200 MG/15ML
LIQUID ORAL AS NEEDED
Status: DISCONTINUED | OUTPATIENT
Start: 2024-05-09 | End: 2024-05-09 | Stop reason: HOSPADM

## 2024-05-09 RX ORDER — DIPHENHYDRAMINE HCL 25 MG
25 CAPSULE ORAL NIGHTLY PRN
Status: DISCONTINUED | OUTPATIENT
Start: 2024-05-09 | End: 2024-05-10 | Stop reason: HOSPADM

## 2024-05-09 RX ORDER — ROCURONIUM BROMIDE 10 MG/ML
INJECTION, SOLUTION INTRAVENOUS AS NEEDED
Status: DISCONTINUED | OUTPATIENT
Start: 2024-05-09 | End: 2024-05-09 | Stop reason: SURG

## 2024-05-09 RX ORDER — MIDAZOLAM HYDROCHLORIDE 1 MG/ML
INJECTION INTRAMUSCULAR; INTRAVENOUS AS NEEDED
Status: DISCONTINUED | OUTPATIENT
Start: 2024-05-09 | End: 2024-05-09 | Stop reason: SURG

## 2024-05-09 RX ORDER — LIDOCAINE HYDROCHLORIDE 40 MG/ML
SOLUTION TOPICAL AS NEEDED
Status: DISCONTINUED | OUTPATIENT
Start: 2024-05-09 | End: 2024-05-09 | Stop reason: SURG

## 2024-05-09 RX ORDER — LAMOTRIGINE 100 MG/1
300 TABLET ORAL 2 TIMES DAILY
Status: DISCONTINUED | OUTPATIENT
Start: 2024-05-09 | End: 2024-05-09

## 2024-05-09 RX ORDER — KETOROLAC TROMETHAMINE 15 MG/ML
15 INJECTION, SOLUTION INTRAMUSCULAR; INTRAVENOUS EVERY 6 HOURS
Qty: 2 ML | Refills: 0 | Status: COMPLETED | OUTPATIENT
Start: 2024-05-09 | End: 2024-05-09

## 2024-05-09 RX ORDER — FENTANYL CITRATE 50 UG/ML
50 INJECTION, SOLUTION INTRAMUSCULAR; INTRAVENOUS
Status: DISCONTINUED | OUTPATIENT
Start: 2024-05-09 | End: 2024-05-09 | Stop reason: HOSPADM

## 2024-05-09 RX ORDER — MEDROXYPROGESTERONE ACETATE 10 MG/1
10 TABLET ORAL 2 TIMES DAILY
Qty: 60 TABLET | Refills: 0 | Status: SHIPPED | OUTPATIENT
Start: 2024-05-09

## 2024-05-09 RX ORDER — ONDANSETRON 4 MG/1
4 TABLET, ORALLY DISINTEGRATING ORAL EVERY 6 HOURS PRN
Status: DISCONTINUED | OUTPATIENT
Start: 2024-05-09 | End: 2024-05-09

## 2024-05-09 RX ORDER — NALOXONE HCL 0.4 MG/ML
0.1 VIAL (ML) INJECTION
Status: DISCONTINUED | OUTPATIENT
Start: 2024-05-09 | End: 2024-05-10 | Stop reason: HOSPADM

## 2024-05-09 RX ORDER — OXYCODONE AND ACETAMINOPHEN 10; 325 MG/1; MG/1
1 TABLET ORAL EVERY 4 HOURS PRN
Status: DISCONTINUED | OUTPATIENT
Start: 2024-05-09 | End: 2024-05-10 | Stop reason: HOSPADM

## 2024-05-09 RX ORDER — GLYCOPYRROLATE 0.2 MG/ML
INJECTION INTRAMUSCULAR; INTRAVENOUS AS NEEDED
Status: DISCONTINUED | OUTPATIENT
Start: 2024-05-09 | End: 2024-05-09 | Stop reason: SURG

## 2024-05-09 RX ADMIN — LAMOTRIGINE 300 MG: 100 TABLET ORAL at 22:31

## 2024-05-09 RX ADMIN — METOPROLOL SUCCINATE 25 MG: 25 TABLET, EXTENDED RELEASE ORAL at 20:55

## 2024-05-09 RX ADMIN — SODIUM CHLORIDE, POTASSIUM CHLORIDE, SODIUM LACTATE AND CALCIUM CHLORIDE 125 ML/HR: 600; 310; 30; 20 INJECTION, SOLUTION INTRAVENOUS at 06:13

## 2024-05-09 RX ADMIN — CEFOXITIN SODIUM 2 G: 2 POWDER, FOR SOLUTION INTRAVENOUS at 12:39

## 2024-05-09 RX ADMIN — ROCURONIUM BROMIDE 15 MG: 10 INJECTION INTRAVENOUS at 11:49

## 2024-05-09 RX ADMIN — ONDANSETRON 4 MG: 2 INJECTION INTRAMUSCULAR; INTRAVENOUS at 13:08

## 2024-05-09 RX ADMIN — SODIUM CHLORIDE 125 ML/HR: 9 INJECTION, SOLUTION INTRAVENOUS at 15:36

## 2024-05-09 RX ADMIN — SODIUM CHLORIDE, POTASSIUM CHLORIDE, SODIUM LACTATE AND CALCIUM CHLORIDE: 600; 310; 30; 20 INJECTION, SOLUTION INTRAVENOUS at 12:46

## 2024-05-09 RX ADMIN — MIDAZOLAM HYDROCHLORIDE 2 MG: 1 INJECTION, SOLUTION INTRAMUSCULAR; INTRAVENOUS at 10:27

## 2024-05-09 RX ADMIN — FENTANYL CITRATE 50 MCG: 50 INJECTION, SOLUTION INTRAMUSCULAR; INTRAVENOUS at 11:50

## 2024-05-09 RX ADMIN — CEFOXITIN SODIUM 2 G: 2 POWDER, FOR SOLUTION INTRAVENOUS at 10:39

## 2024-05-09 RX ADMIN — DEXMEDETOMIDINE HYDROCHLORIDE IN 0.9% SODIUM CHLORIDE 4 MCG: 4 INJECTION INTRAVENOUS at 11:50

## 2024-05-09 RX ADMIN — KETOROLAC TROMETHAMINE 15 MG: 15 INJECTION, SOLUTION INTRAMUSCULAR; INTRAVENOUS at 15:35

## 2024-05-09 RX ADMIN — PROPOFOL 150 MG: 10 INJECTION, EMULSION INTRAVENOUS at 10:30

## 2024-05-09 RX ADMIN — GABAPENTIN 100 MG: 100 CAPSULE ORAL at 20:55

## 2024-05-09 RX ADMIN — DEXMEDETOMIDINE HYDROCHLORIDE IN 0.9% SODIUM CHLORIDE 4 MCG: 4 INJECTION INTRAVENOUS at 10:35

## 2024-05-09 RX ADMIN — LAMOTRIGINE 300 MG: 100 TABLET ORAL at 09:03

## 2024-05-09 RX ADMIN — DEXMEDETOMIDINE HYDROCHLORIDE IN 0.9% SODIUM CHLORIDE 8 MCG: 4 INJECTION INTRAVENOUS at 13:02

## 2024-05-09 RX ADMIN — KETOROLAC TROMETHAMINE 15 MG: 15 INJECTION, SOLUTION INTRAMUSCULAR; INTRAVENOUS at 22:31

## 2024-05-09 RX ADMIN — OXYCODONE 5 MG: 5 TABLET ORAL at 18:33

## 2024-05-09 RX ADMIN — DEXMEDETOMIDINE HYDROCHLORIDE IN 0.9% SODIUM CHLORIDE 4 MCG: 4 INJECTION INTRAVENOUS at 10:27

## 2024-05-09 RX ADMIN — GABAPENTIN 100 MG: 100 CAPSULE ORAL at 15:35

## 2024-05-09 RX ADMIN — GLYCOPYRROLATE 0.2 MG: 0.2 INJECTION INTRAMUSCULAR; INTRAVENOUS at 10:54

## 2024-05-09 RX ADMIN — ROCURONIUM BROMIDE 50 MG: 10 INJECTION INTRAVENOUS at 10:31

## 2024-05-09 RX ADMIN — ROCURONIUM BROMIDE 10 MG: 10 INJECTION INTRAVENOUS at 12:57

## 2024-05-09 RX ADMIN — FAMOTIDINE 20 MG: 20 TABLET, FILM COATED ORAL at 09:58

## 2024-05-09 RX ADMIN — ACETAMINOPHEN 1000 MG: 500 TABLET ORAL at 09:58

## 2024-05-09 RX ADMIN — PROPOFOL 50 MG: 10 INJECTION, EMULSION INTRAVENOUS at 10:32

## 2024-05-09 RX ADMIN — LIDOCAINE HYDROCHLORIDE 100 MG: 10 INJECTION, SOLUTION EPIDURAL; INFILTRATION; INTRACAUDAL; PERINEURAL at 10:30

## 2024-05-09 RX ADMIN — SUGAMMADEX 200 MG: 100 INJECTION, SOLUTION INTRAVENOUS at 13:10

## 2024-05-09 RX ADMIN — GABAPENTIN 600 MG: 300 CAPSULE ORAL at 09:58

## 2024-05-09 RX ADMIN — ESTRADIOL 1 PATCH: 0.05 PATCH TRANSDERMAL at 17:28

## 2024-05-09 RX ADMIN — LIDOCAINE HYDROCHLORIDE 1 EACH: 40 SOLUTION TOPICAL at 10:32

## 2024-05-09 RX ADMIN — DEXAMETHASONE SODIUM PHOSPHATE 4 MG: 4 INJECTION, SOLUTION INTRAMUSCULAR; INTRAVENOUS at 10:51

## 2024-05-09 RX ADMIN — TRANEXAMIC ACID 1000 MG: 10 INJECTION, SOLUTION INTRAVENOUS at 06:57

## 2024-05-09 RX ADMIN — SODIUM CHLORIDE 2 G: 900 INJECTION INTRAVENOUS at 20:55

## 2024-05-09 RX ADMIN — FENTANYL CITRATE 50 MCG: 50 INJECTION, SOLUTION INTRAMUSCULAR; INTRAVENOUS at 10:27

## 2024-05-09 RX ADMIN — PROPOFOL 50 MCG/KG/MIN: 10 INJECTION, EMULSION INTRAVENOUS at 10:35

## 2024-05-09 RX ADMIN — SODIUM CHLORIDE, POTASSIUM CHLORIDE, SODIUM LACTATE AND CALCIUM CHLORIDE 125 ML/HR: 600; 310; 30; 20 INJECTION, SOLUTION INTRAVENOUS at 09:47

## 2024-05-09 RX ADMIN — SCOPOLAMINE 1 PATCH: 1.5 PATCH, EXTENDED RELEASE TRANSDERMAL at 09:58

## 2024-05-09 NOTE — ED NOTES
Mayelin Penn    Nursing Report ED to Floor:  Mental status: A&OX4  Ambulatory status: IND  Oxygen Therapy:  RA  Cardiac Rhythm: NSR  Admitted from: home  Safety Concerns:  fall risk d/t low hgb, dizziness, recent syncopal episode  Social Issues: none noted at this time.  ED Room #:  20    ED Nurse Phone Extension - [phone] or may call [phone].      HPI:   Chief Complaint   Patient presents with    Vaginal Bleeding       Past Medical History:  Past Medical History:   Diagnosis Date    Abnormal Pap smear of cervix     Anxiety     Bilateral ovarian cysts     Leiomyoma of uterus     Seizures     Dr. Parisi    Tachycardia         Past Surgical History:  Past Surgical History:   Procedure Laterality Date    APPENDECTOMY      CERVICAL BIOPSY  2019    benign, negative for dysplasia or carcinoma    CERVICAL CONIZATION       SECTION       SECTION PRIMARY      DIAGNOSTIC LAPAROSCOPY, SALPINGO OOPHORECTOMY LAPAROSCOPIC N/A 11/10/2021    Procedure: REMOVAL OF RIGHT OVARIAN MASS, LAPROSCOPIC;  Surgeon: Ana Vargas MD;  Location: [facility];  Service: Obstetrics/Gynecology;  Laterality: N/A;    TUBAL ABDOMINAL LIGATION          Admitting Doctor:   Ani Mclaughlin MD    Consulting Provider(s):  Consults       No orders found from 4/10/2024 to 5/10/2024.             Admitting Diagnosis:   The primary encounter diagnosis was Menorrhagia with irregular cycle. A diagnosis of Blood loss anemia was also pertinent to this visit.    Most Recent Vitals:   Vitals:    24 0323 24 0324 24 0325 24 0330   BP: 111/74 103/66 103/66 117/58   BP Location:       Patient Position:       Pulse: 74 76 71 79   Resp: 16      Temp: 97.5 °F (36.4 °C)      TempSrc:       SpO2: 97% 98% 97% 97%   Weight:       Height:           Active LDAs/IV Access:   Lines, Drains & Airways       Active LDAs       Name Placement date Placement time Site Days    Peripheral IV 24 Left Antecubital 24   Antecubital  less than 1    Peripheral IV 05/09/24 0303 Anterior;Right;Upper Arm 05/09/24 0303  Arm  less than 1                    Labs (abnormal labs have a star):   Labs Reviewed   CBC WITH AUTO DIFFERENTIAL - Abnormal; Notable for the following components:       Result Value    RBC 2.75 (*)     Hemoglobin 7.8 (*)     Hematocrit 25.7 (*)     MCHC 30.4 (*)     All other components within normal limits    Narrative:     Appended report. These results have been appended to a previously verified report.   COMPREHENSIVE METABOLIC PANEL - Abnormal; Notable for the following components:    Glucose 105 (*)     Total Protein 5.3 (*)     Alkaline Phosphatase 35 (*)     All other components within normal limits    Narrative:     GFR Normal >60  Chronic Kidney Disease <60  Kidney Failure <15     POCT CHEM 8 - Abnormal; Notable for the following components:    Hemoglobin 6.8 (*)     Hematocrit 20 (*)     All other components within normal limits   SCAN SLIDE - Normal   RAINBOW DRAW    Narrative:     The following orders were created for panel order [place] Draw.  Procedure                               Abnormality         Status                     ---------                               -----------         ------                     Green Top (Gel)[[id number]]                                  Final result               Lavender Top[[id number]]                                     Final result               Gold Top - SST[[id number]]                                   Final result               Gray Top[[id number]]                                         Final result               Light Blue Top[[id number]]                                   Final result                 Please view results for these tests on the individual orders.   TYPE AND SCREEN   PREPARE RBC   CBC AND DIFFERENTIAL    Narrative:     The following orders were created for panel order CBC & Differential.  Procedure                               Abnormality          Status                     ---------                               -----------         ------                     CBC Auto Differential[[id number]]        Abnormal            Final result               Scan Slide[[id number]]                   Normal              Final result                 Please view results for these tests on the individual orders.   GREEN TOP   LAVENDER TOP   GOLD TOP - SST   GRAY TOP   LIGHT BLUE TOP       Meds Given in ED:   Medications   sodium chloride 0.9 % flush 10 mL (has no administration in time range)           Last NIH score:                                                          Dysphagia screening results:  Patient Factors Component (Dysphagia:Stroke or Rule-out)  Best Eye Response: 4-->(E4) spontaneous (05/09/24 0205)  Best Motor Response: 6-->(M6) obeys commands (05/09/24 0205)  Best Verbal Response: 5-->(V5) oriented (05/09/24 0205)  Chantel Coma Scale Score: 15 (05/09/24 0205)     Chantel Coma Scale:  No data recorded     CIWA:        Restraint Type:            Isolation Status:  No active isolations

## 2024-05-09 NOTE — CASE MANAGEMENT/SOCIAL WORK
Continued Stay Note  [facility]     Patient Name: Mayelin Penn  MRN: [id number]  Today's Date: 5/9/2024    Admit Date: 5/9/2024    Plan: Unable to assess   Discharge Plan       Row Name 05/09/24 1444       Plan    Plan Unable to assess    Patient/Family in Agreement with Plan unable to assess    Plan Comments SW'er stopped by to met with patient. Patient off the floor unable to assess to complete IDP.                   Discharge Codes    No documentation.                       Elisha Bartlett) SALVADOR Arboleda

## 2024-05-09 NOTE — Clinical Note
Admitting Physician: REYNA PALACIO [[id number]]  Attending Physician: REYNA PALACIO [[id number]]  Unit: [facility] LABOR DELIVERY [[id number]]

## 2024-05-09 NOTE — PROGRESS NOTES
Patient is well known to me.  Plan TLH with removal of left fallopian tube and possibly the ovary today.  Discussed pros/cons of removing that ovary and if removed the need for ERT.  Take seizure meds with sips.

## 2024-05-09 NOTE — PLAN OF CARE
Goal Outcome Evaluation:  Plan of Care Reviewed With: patient, family        Progress: no change  Outcome Evaluation: Pt transferred to floor from ED. No c/o pain. Brief in use for vaginal bleeding. Pad count at approximately 3 per hour. Witnessed pt passing blood clots. Abdominal palpation increases bleeding. Abdomen soft and nontender. 2 units PRBCs infusing. VSS on RA. MD aware of patient condition. Continue POC.

## 2024-05-09 NOTE — ED PROVIDER NOTES
Subjective  History of Present Illness:    Chief Complaint: Vaginal bleeding, weakness, passing out  History of Present Illness: 48-year-old female with vaginal bleeding, weakness, and she passed out earlier today after using the bathroom.  She was seen in the emergency department and admitted back in 2024 for vaginal bleeding, she was admitted after receiving 2 units of blood, she sees her primary OB/GYN Dr. Castro and is scheduled to have a hysterectomy but she continues to have heavy vaginal bleeding, and has been for over a month.  She has been on progesterone, and just recently TXA was added.  She states that she is going through 2-3 pads an hour at times.  She has had a previous partial hysterectomy, removal of her right ovary.  Onset: Gradual  Duration: Several weeks, worse over the last several days  Exacerbating / Alleviating factors: Heavy vaginal bleeding  Associated symptoms: Weakness, passed out earlier today      Nurses Notes reviewed and agree, including vitals, allergies, social history and prior medical history.     REVIEW OF SYSTEMS: All systems reviewed and not pertinent unless noted.    Review of Systems   Genitourinary:  Positive for vaginal bleeding.   Neurological:  Positive for syncope and weakness.   All other systems reviewed and are negative.      Past Medical History:   Diagnosis Date    Abnormal Pap smear of cervix     Anxiety     Bilateral ovarian cysts     Leiomyoma of uterus     Seizures     Dr. Parisi    Tachycardia        Allergies:    Patient has no known allergies.      Past Surgical History:   Procedure Laterality Date    APPENDECTOMY      CERVICAL BIOPSY  2019    benign, negative for dysplasia or carcinoma    CERVICAL CONIZATION       SECTION       SECTION PRIMARY      DIAGNOSTIC LAPAROSCOPY, SALPINGO OOPHORECTOMY LAPAROSCOPIC N/A 11/10/2021    Procedure: REMOVAL OF RIGHT OVARIAN MASS, LAPROSCOPIC;  Surgeon: Ana Vargas MD;  [facility];  Service: Obstetrics/Gynecology;  Laterality: N/A;    TUBAL ABDOMINAL LIGATION           Social History     Socioeconomic History    Marital status:    Tobacco Use    Smoking status: Never    Smokeless tobacco: Never   Vaping Use    Vaping status: Never Used   Substance and Sexual Activity    Alcohol use: Never    Drug use: Never    Sexual activity: Yes     Partners: Male     Birth control/protection: Pill, Surgical         Family History   Problem Relation Age of Onset    Breast cancer Neg Hx     Ovarian cancer Neg Hx     Uterine cancer Neg Hx     Colon cancer Neg Hx        Objective  Physical Exam:  /63   Pulse 71   Temp 98.1 °F (36.7 °C) (Oral)   Resp 16   Ht 170.2 cm (67\")   Wt 78 kg (172 lb)   LMP 05/09/2024 (Exact Date)   SpO2 96%   BMI 26.94 kg/m²      Physical Exam  Vitals and nursing note reviewed.   Constitutional:       Appearance: She is well-developed.   HENT:      Head: Normocephalic and atraumatic.   Eyes:      Comments: Pale conjunctiva   Cardiovascular:      Rate and Rhythm: Normal rate and regular rhythm.   Pulmonary:      Effort: Pulmonary effort is normal.      Breath sounds: Normal breath sounds.   Abdominal:      Palpations: Abdomen is soft.   Musculoskeletal:         General: Normal range of motion.      Cervical back: Normal range of motion and neck supple.   Skin:     General: Skin is warm and dry.   Neurological:      Mental Status: She is alert and oriented to person, place, and time.      Deep Tendon Reflexes: Reflexes are normal and symmetric.           Critical Care    Performed by: Viktor Schuler Jr. PA-C  Authorized by: Rishi Kahn MD    Critical care provider statement:     Critical care time (minutes):  30    Critical care time was exclusive of:  Separately billable procedures and treating other patients and teaching time    Critical care was necessary to treat or prevent imminent or life-threatening deterioration of the following conditions: " Acute blood loss anemia, requiring 2 units packed red blood cells, and admission.    Critical care was time spent personally by me on the following activities:  Blood draw for specimens, discussions with consultants, discussions with primary provider, evaluation of patient's response to treatment, development of treatment plan with patient or surrogate, examination of patient, ordering and performing treatments and interventions, ordering and review of laboratory studies, ordering and review of radiographic studies, pulse oximetry, re-evaluation of patient's condition and review of old charts    Care discussed with: admitting provider        ED Course:    ED Course as of 05/09/24 0434   Thu May 09, 2024   0217 Review of previous  non ED visits, prior labs, prior imaging, available notes from prior evaluations or visits with specialists, medication list, allergies, past medical history, past surgical history     [CS]   0218 I Personally reviewed all laboratory studies performed in the emergency department  [CS]   0218 Discussed the case with Dr. Mclaughlin OB/GYN, she is on-call for Dr. Vargas, she recommended 2 units packed red blood cells, admission, and the patient did have a discussion with Dr. Castro in the morning about plan of care. [CS]   0219 Updated patient and family on plan, answered all questions appropriately. [CS]   0257 Discussed the case with Dr. Puente  she felt that this patient needs to be admitted to OB/GYN, rereaching out to Dr. Mclaughlin [CS]      ED Course User Index  [CS] Viktor Schuler Jr., PA-C       Lab Results (last 24 hours)       Procedure Component Value Units Date/Time    CBC & Differential [[id number]]  (Abnormal) Collected: 05/09/24 0117    Specimen: Blood Updated: 05/09/24 0205    Narrative:      The following orders were created for panel order CBC & Differential.  Procedure                               Abnormality         Status                     ---------                                -----------         ------                     CBC Auto Differential[[id number]]        Abnormal            Final result               Scan Slide[[id number]]                   Normal              Final result                 Please view results for these tests on the individual orders.    CBC Auto Differential [[id number]]  (Abnormal) Collected: 05/09/24 0117    Specimen: Blood Updated: 05/09/24 0205     WBC 5.79 10*3/mm3      RBC 2.75 10*6/mm3      Hemoglobin 7.8 g/dL      Hematocrit 25.7 %      MCV 93.5 fL      MCH 28.4 pg      MCHC 30.4 g/dL      RDW 14.8 %      RDW-SD 50.2 fl      MPV 10.3 fL      Platelets 212 10*3/mm3      Neutrophil % 63.7 %      Lymphocyte % 25.9 %      Monocyte % 6.4 %      Eosinophil % 3.3 %      Basophil % 0.5 %      Immature Grans % 0.2 %      Neutrophils, Absolute 3.69 10*3/mm3      Lymphocytes, Absolute 1.50 10*3/mm3      Monocytes, Absolute 0.37 10*3/mm3      Eosinophils, Absolute 0.19 10*3/mm3      Basophils, Absolute 0.03 10*3/mm3      Immature Grans, Absolute 0.01 10*3/mm3      nRBC 0.0 /100 WBC     Narrative:      Appended report. These results have been appended to a previously verified report.    Scan Slide [[id number]]  (Normal) Collected: 05/09/24 0117    Specimen: Blood Updated: 05/09/24 0205     RBC Morphology Normal     WBC Morphology Normal     Platelet Morphology Normal    POC Chem 8 [[id number]]  (Abnormal) Collected: 05/09/24 0202    Specimen: Blood Updated: 05/09/24 0205     Glucose 107 mg/dL      BUN 11 mg/dL      Creatinine 1.00 mg/dL      Sodium 139 mmol/L      POC Potassium 4.2 mmol/L      Chloride 101 mmol/L      Total CO2 25 mmol/L      Hemoglobin 6.8 g/dL      Comment: Serial Number: [id number]:  [id number]        Hematocrit 20 %      Ionized Calcium 1.24 mmol/L      eGFR 70.5 mL/min/1.73     Comprehensive Metabolic Panel [[id number]]  (Abnormal) Collected: 05/09/24 0205    Specimen: Blood Updated: 05/09/24 0228     Glucose 105 mg/dL      BUN 11 mg/dL      Creatinine  0.94 mg/dL      Sodium 140 mmol/L      Potassium 4.3 mmol/L      Chloride 106 mmol/L      CO2 28.0 mmol/L      Calcium 8.8 mg/dL      Total Protein 5.3 g/dL      Albumin 3.6 g/dL      ALT (SGPT) 8 U/L      AST (SGOT) 13 U/L      Alkaline Phosphatase 35 U/L      Total Bilirubin 0.2 mg/dL      Globulin 1.7 gm/dL      Comment: Calculated Result        A/G Ratio 2.1 g/dL      BUN/Creatinine Ratio 11.7     Anion Gap 6.0 mmol/L      eGFR 75.9 mL/min/1.73     Narrative:      GFR Normal >60  Chronic Kidney Disease <60  Kidney Failure <15               No radiology results from the last 24 hrs       Medical Decision Making  Patient Presentation 48-year-old female presented with intermittent vaginal bleeding, that became heavy over the last 1 day,, initial assessment, abdomen soft nontender nondistended    DDX menorrhagia, dysfunctional uterine bleeding, blood loss anemia, electrolyte abnormality    Data Review/ Non ED Records /Analysis/Ordering unique tests  Review of previous  non ED visits, prior labs, prior imaging, available notes from prior evaluations or visits with specialists, medication list, allergies, past medical history, past surgical history        Independent Review Studies  I Personally reviewed all laboratory studies performed in the emergency department     Intervention/Re-evaluation intervention included 2 units packed red blood cells due to blood loss anemia    Independent Clinician discussed with the on-call OB/GYN Dr. Franco, discussed with my supervising physician Dr. Hernandez    Risk Stratification tools/clinical decision rules patient presented with known intermittent vaginal bleeding and menorrhagia for several weeks, on progesterone and recently started on TXA, continue to have vaginal bleeding, that became worse over the last day and also had a syncopal episode, was found to be symptomatic anemia with a hemoglobin of 7.8 high Probability of sudden clinically significant, or life threatening  deterioration in the patients condition requiring direct delivery of care and management,  and high complexity decision making emergent intervention included IV access, 2 units of packed red blood cells, and admission for further care    Shared Decision Making discussed this plan of care with patient and family they were agreeable    Disposition patient stable for admission    Problems Addressed:  Blood loss anemia: complicated acute illness or injury  Menorrhagia with irregular cycle: complicated acute illness or injury    Amount and/or Complexity of Data Reviewed  External Data Reviewed: labs, radiology and notes.  Labs: ordered. Decision-making details documented in ED Course.    Risk  Decision regarding hospitalization.          Final diagnoses:   Menorrhagia with irregular cycle   Blood loss anemia           Disposition admission       Viktor Schuler Jr., PA-C  05/09/24 0434

## 2024-05-09 NOTE — NURSING NOTE
"Phlebotomy called  regarding status of stat labs collection 0515. \"On their way to floor to collect.\"  "

## 2024-05-09 NOTE — OP NOTE
Operative Note:    Subjective     Date of Service:  05/09/24  Time of Service:  13:45 EDT    Attending:  Surgical asst:                     Surgeons and Role:     * Ana Vargas MD - Primary     * Endy Ellis MD - Assisting required for correct placement of vaginal instruments, uterine manipulation, suctioning, retraction, tying , suturing, closing, controlling blood loss, observation for inadvertent injuries, and cystoscopy.  These surgical procedures required a skilled assistant surgeon to ensure an uncomplicated successful case.          Pre-operative diagnosis(es): Uterine leiomyomata  Menorrhagia requiring transfusion     Post-operative diagnosis(es): Same.  Vulvar lesion   Procedure(s): Total Laparoscopic hysterectomy, left salpingoophorectomy   Antibiotics: cefoxitin (Mefoxin) ordered on call to OR     Anesthesia: Type: General  ASA:  II     Objective      Operative findings: Vulvar lesion upper left labia    Specimens removed: Specimens       ID Source Type Tests Collected By Collected At Frozen?    A Uterus, Cervix, L Fallopian Tube, L Ovary Tissue TISSUE PATHOLOGY EXAM   Ana Vargas MD 5/9/24 1232 No    This specimen was not marked as sent.    B Labia Tissue TISSUE PATHOLOGY EXAM   Ana Vargas MD 5/9/24 1058 No    Description: RIGHT LABIA LESION    This specimen was not marked as sent.           Fluid Intake: 1200 mL   Output: Documented Output  Est. Blood Loss 200 mL  Urine Output 325 mL    I/O this shift:  In: 1650 [I.V.:1200; Blood:350; IV Piggyback:100]  Out: 800 [Urine:650; Blood:150]       Blood products used: Yes   Drains: Urethral Catheter Double-lumen (Active)   Output (mL) 325 mL 05/09/24 1342      Implant Information:    Complications:    Intraoperative consult(s):    Condition: stable   Disposition: to PACU and then admit to  medical - surgical floor         Procedure Note: Patient was taken to the operating room where she underwent general endotracheal anesthesia and was then  prepped and draped in the usual sterile fashion.  A weighted vaginal speculum was placed in the posterior vaginal fornix and the internal os was identified after placing a single-tooth tenaculum on the anterior lip of the cervix.  She was actively bleeding from the uterus and pooling in the vagina. The uterus was sounded to 12 and the correct size uterine manipulator and O ring was then placed and secured with a 0 Vicryl.  The intrauterine balloon and vaginal bleeding were then inflated and a Cross catheter was placed.  A small raised white lesion on upper labia on left was removed with pickups and scissors.  No stitch or cautery required for hemostasis.    At this point attention was turned to the laparoscopic portion of the case.  An incision was made in the umbilicus and a 12 mm trocar was placed through the fascia and peritoneum without difficulty.  Intra-abdominal placement was confirmed with low CO2 pressure and direct visualization.  The laparoscope was placed and the anterior abdomen and pelvis was inspected with findings of 14-16 week uterus and enlarged left ovary and evidence of previous right salpingo oophorectomy. While the uterus was large the co ring could be seen anteriorly and posteriorly and felt the case could continue laparoscopically.  No adhesions noted. A 12 mm trocar was placed into the left lower quadrant under direct visualization followed by a 5 mm right lower quadrant trocar both placed with care to avoid injury to the inferior epigastric artery.  Both ureters were then visualized peristalsing in the broad ligaments or pelvic sidewalls.  The left fallopian tube was elevated and with the Harmonic scalpel the tube was transected to the level of the uterine ovarian ligament.  The uterine ovarian ligament was then transected taken to the level of the round ligament and then ultimately to the uterine artery.  The uterine artery was skeletonized.  The uterine artery due to size and vascularity  feeding the leiomyomata required Hemoclips for hemostasis and felt to be well away from ureter.  Bladder flap was taken down with care to avoid injury to the bladder and with use of an endo Kitner.  Once the bladder flap was adequately developed attention was turned to the right side.  The same procedure was performed on the right with transection of the right fallopian tube round ligament to the level of the uterine artery which was then secured with the Harmonic scalpel.  At this point the Harmonic scalpel was used to enter the anterior vaginal wall at the anterior bevel of the O ring and then the vagina was circumferentially transected and the cervix, uterus, and tubes removed through the vagina.  This took approximately 45 minutes given the size of uterus.  A uterus was morcellated through the vagina  with care to protect the vaginal side walls.  Once the uterus was removed in piece meal fashion the the vaginal occluder was replaced and the vaginal cuff was closed with the Endo Stitch in a 2 layer fashion.  Good hemostasis was noted.  Cystoscopy was now performed.  No evidence of any stitches or bladder injuries and urine could be seen spilling easily from both ureteral orifices.  After cystoscopy a small vaginal laceration was noted to be bleeding and repair with one stitch of 2-0 vicryll. Attention was turned back to the intra-abdominal portion of the case.  All pedicles were inspected and noted be hemostatic and any areas of non-hemostasis were made hemostatic with Bovie electrocautery.  Interceed was placed over the vaginal cuff and all pedicles were inspected on low CO2 pressure.  The right lower quadrant trocar site was closed with a Chris-Calvo device and the remaining incisions with 3-0 Vicryl and surgical glue.  The patient went to recovery awake and stable.  All sponge, instrument, and needle counts were correct.               Ana Vargas MD  05/09/24  13:45 EDT

## 2024-05-09 NOTE — PROGRESS NOTES
Notified by RN when patient arrived to floor with increased bleeding.   Orders given for additional IV and maintenance IVF with LR. Vitals sign stable. First unit of prbc in process.     Stat repeat labs ordered  Txa 1 gram ordered

## 2024-05-10 ENCOUNTER — TELEPHONE (OUTPATIENT)
Dept: OBSTETRICS AND GYNECOLOGY | Facility: CLINIC | Age: 49
End: 2024-05-10
Payer: COMMERCIAL

## 2024-05-10 VITALS
DIASTOLIC BLOOD PRESSURE: 72 MMHG | TEMPERATURE: 98.8 F | WEIGHT: 171.96 LBS | BODY MASS INDEX: 26.99 KG/M2 | OXYGEN SATURATION: 97 % | SYSTOLIC BLOOD PRESSURE: 121 MMHG | HEIGHT: 67 IN | RESPIRATION RATE: 18 BRPM | HEART RATE: 77 BPM

## 2024-05-10 PROBLEM — Z90.710 STATUS POST LAPAROSCOPIC HYSTERECTOMY: Status: ACTIVE | Noted: 2024-05-10

## 2024-05-10 PROBLEM — D14.30: Status: RESOLVED | Noted: 2024-05-09 | Resolved: 2024-05-10

## 2024-05-10 PROBLEM — N92.0 MENORRHAGIA: Status: RESOLVED | Noted: 2024-05-09 | Resolved: 2024-05-10

## 2024-05-10 LAB
ANION GAP SERPL CALCULATED.3IONS-SCNC: 6 MMOL/L (ref 5–15)
BASOPHILS # BLD AUTO: 0.02 10*3/MM3 (ref 0–0.2)
BASOPHILS NFR BLD AUTO: 0.2 % (ref 0–1.5)
BH BB BLOOD EXPIRATION DATE: NORMAL
BH BB BLOOD EXPIRATION DATE: NORMAL
BH BB BLOOD TYPE BARCODE: 8400
BH BB BLOOD TYPE BARCODE: 8400
BH BB DISPENSE STATUS: NORMAL
BH BB DISPENSE STATUS: NORMAL
BH BB PRODUCT CODE: NORMAL
BH BB PRODUCT CODE: NORMAL
BH BB UNIT NUMBER: NORMAL
BH BB UNIT NUMBER: NORMAL
BUN SERPL-MCNC: 7 MG/DL (ref 6–20)
BUN/CREAT SERPL: 7.9 (ref 7–25)
CALCIUM SPEC-SCNC: 7.4 MG/DL (ref 8.6–10.5)
CHLORIDE SERPL-SCNC: 110 MMOL/L (ref 98–107)
CO2 SERPL-SCNC: 24 MMOL/L (ref 22–29)
CREAT SERPL-MCNC: 0.89 MG/DL (ref 0.57–1)
CROSSMATCH INTERPRETATION: NORMAL
CROSSMATCH INTERPRETATION: NORMAL
DEPRECATED RDW RBC AUTO: 51.5 FL (ref 37–54)
DEPRECATED RDW RBC AUTO: 52.3 FL (ref 37–54)
EGFRCR SERPLBLD CKD-EPI 2021: 81.1 ML/MIN/1.73
EOSINOPHIL # BLD AUTO: 0.07 10*3/MM3 (ref 0–0.4)
EOSINOPHIL NFR BLD AUTO: 0.8 % (ref 0.3–6.2)
ERYTHROCYTE [DISTWIDTH] IN BLOOD BY AUTOMATED COUNT: 15.4 % (ref 12.3–15.4)
ERYTHROCYTE [DISTWIDTH] IN BLOOD BY AUTOMATED COUNT: 15.8 % (ref 12.3–15.4)
GLUCOSE SERPL-MCNC: 103 MG/DL (ref 65–99)
HCT VFR BLD AUTO: 23 % (ref 34–46.6)
HCT VFR BLD AUTO: 24 % (ref 34–46.6)
HGB BLD-MCNC: 7.1 G/DL (ref 12–15.9)
HGB BLD-MCNC: 7.3 G/DL (ref 12–15.9)
IMM GRANULOCYTES # BLD AUTO: 0.04 10*3/MM3 (ref 0–0.05)
IMM GRANULOCYTES NFR BLD AUTO: 0.5 % (ref 0–0.5)
LYMPHOCYTES # BLD AUTO: 1.47 10*3/MM3 (ref 0.7–3.1)
LYMPHOCYTES NFR BLD AUTO: 17.4 % (ref 19.6–45.3)
MCH RBC QN AUTO: 28.3 PG (ref 26.6–33)
MCH RBC QN AUTO: 28.7 PG (ref 26.6–33)
MCHC RBC AUTO-ENTMCNC: 30.4 G/DL (ref 31.5–35.7)
MCHC RBC AUTO-ENTMCNC: 30.9 G/DL (ref 31.5–35.7)
MCV RBC AUTO: 93 FL (ref 79–97)
MCV RBC AUTO: 93.1 FL (ref 79–97)
MONOCYTES # BLD AUTO: 0.54 10*3/MM3 (ref 0.1–0.9)
MONOCYTES NFR BLD AUTO: 6.4 % (ref 5–12)
NEUTROPHILS NFR BLD AUTO: 6.32 10*3/MM3 (ref 1.7–7)
NEUTROPHILS NFR BLD AUTO: 74.7 % (ref 42.7–76)
NRBC BLD AUTO-RTO: 0 /100 WBC (ref 0–0.2)
PLATELET # BLD AUTO: 173 10*3/MM3 (ref 140–450)
PLATELET # BLD AUTO: 183 10*3/MM3 (ref 140–450)
PMV BLD AUTO: 9.5 FL (ref 6–12)
PMV BLD AUTO: 9.6 FL (ref 6–12)
POTASSIUM SERPL-SCNC: 4.2 MMOL/L (ref 3.5–5.2)
RBC # BLD AUTO: 2.47 10*6/MM3 (ref 3.77–5.28)
RBC # BLD AUTO: 2.58 10*6/MM3 (ref 3.77–5.28)
SODIUM SERPL-SCNC: 140 MMOL/L (ref 136–145)
UNIT  ABO: NORMAL
UNIT  ABO: NORMAL
UNIT  RH: NORMAL
UNIT  RH: NORMAL
WBC NRBC COR # BLD AUTO: 7.48 10*3/MM3 (ref 3.4–10.8)
WBC NRBC COR # BLD AUTO: 8.46 10*3/MM3 (ref 3.4–10.8)

## 2024-05-10 PROCEDURE — 85025 COMPLETE CBC W/AUTO DIFF WBC: CPT | Performed by: OBSTETRICS & GYNECOLOGY

## 2024-05-10 PROCEDURE — 25810000003 SODIUM CHLORIDE 0.9 % SOLUTION: Performed by: OBSTETRICS & GYNECOLOGY

## 2024-05-10 PROCEDURE — 80048 BASIC METABOLIC PNL TOTAL CA: CPT | Performed by: OBSTETRICS & GYNECOLOGY

## 2024-05-10 PROCEDURE — 85027 COMPLETE CBC AUTOMATED: CPT | Performed by: OBSTETRICS & GYNECOLOGY

## 2024-05-10 PROCEDURE — 25010000002 CEFAZOLIN PER 500 MG: Performed by: OBSTETRICS & GYNECOLOGY

## 2024-05-10 PROCEDURE — G0378 HOSPITAL OBSERVATION PER HR: HCPCS

## 2024-05-10 RX ORDER — ESTRADIOL 0.05 MG/D
1 PATCH TRANSDERMAL WEEKLY
Qty: 4 PATCH | Refills: 3 | Status: SHIPPED | OUTPATIENT
Start: 2024-05-16

## 2024-05-10 RX ORDER — OXYCODONE HYDROCHLORIDE 5 MG/1
5 TABLET ORAL EVERY 6 HOURS PRN
Qty: 15 TABLET | Refills: 0 | Status: SHIPPED | OUTPATIENT
Start: 2024-05-10 | End: 2024-05-14

## 2024-05-10 RX ADMIN — LAMOTRIGINE 300 MG: 100 TABLET ORAL at 08:38

## 2024-05-10 RX ADMIN — OXYCODONE 5 MG: 5 TABLET ORAL at 06:11

## 2024-05-10 RX ADMIN — SODIUM CHLORIDE 2 G: 900 INJECTION INTRAVENOUS at 04:24

## 2024-05-10 RX ADMIN — MELOXICAM 7.5 MG: 7.5 TABLET ORAL at 08:38

## 2024-05-10 RX ADMIN — SODIUM CHLORIDE 125 ML/HR: 9 INJECTION, SOLUTION INTRAVENOUS at 00:46

## 2024-05-10 RX ADMIN — OXYCODONE 5 MG: 5 TABLET ORAL at 01:55

## 2024-05-10 RX ADMIN — GABAPENTIN 100 MG: 100 CAPSULE ORAL at 08:38

## 2024-05-10 RX ADMIN — SODIUM CHLORIDE 125 ML/HR: 9 INJECTION, SOLUTION INTRAVENOUS at 08:41

## 2024-05-10 NOTE — PLAN OF CARE
Goal Outcome Evaluation:  Plan of Care Reviewed With: patient        Progress: improving  Outcome Evaluation: VSS on RA. Oriented x4. Drowsy and sleeping most of the shift, but easily woken. Lap sites x3 CDI. Small vaginal bleeding noted. Abdomen soft and appropriately tender. Lungs CTA. Pain controlled with oral medications. Adequate UOP. Diet advanced per order. Daughter at bedside.

## 2024-05-10 NOTE — H&P
[place]  Mayelin Penn  : 1975  MRN: [id number]  [facility]: [id number]    Subjective   Chief Complaint   Patient presents with    Vaginal Bleeding     Mayelin Penn is a 48 y.o. year old  who presented to the ER with recurrent and persistent heavy vaginal bleeding.  She was found to be anemic and had a previous syncopal episode initiating her decision to present to the ER.    Past Medical History:   Diagnosis Date    Abnormal Pap smear of cervix     Anxiety     Bilateral ovarian cysts     Leiomyoma of uterus     Seizures     Dr. Parisi last seizure 2024    Tachycardia      Past Surgical History:   Procedure Laterality Date    APPENDECTOMY      CERVICAL BIOPSY  2019    benign, negative for dysplasia or carcinoma    CERVICAL CONIZATION       SECTION       SECTION PRIMARY      DIAGNOSTIC LAPAROSCOPY, SALPINGO OOPHORECTOMY LAPAROSCOPIC N/A 11/10/2021    Procedure: REMOVAL OF RIGHT OVARIAN MASS, LAPROSCOPIC;  Surgeon: Ana Vargas MD;  Location:  [facility];  Service: Obstetrics/Gynecology;  Laterality: N/A;    TOTAL LAPAROSCOPIC HYSTERECTOMY N/A 2024    Procedure: TOTAL LAPAROSCOPIC HYSTERECTOMY LEFT SALPINGOOPHERECTOMY;  Surgeon: Ana Vargas MD;  Location:  [facility];  Service: Obstetrics/Gynecology;  Laterality: N/A;    TUBAL ABDOMINAL LIGATION      VULVA BIOPSY Right 2024    Procedure: VULVA BIOPSY;  Surgeon: Ana Vargas MD;  Location:  [facility];  Service: Obstetrics/Gynecology;  Laterality: Right;     OB History    Para Term  AB Living   3 3 0 0 0 3   SAB IAB Ectopic Molar Multiple Live Births   0 0 0 0 0 3      # Outcome Date GA Lbr Marlon/2nd Weight Sex Type Anes PTL Lv   3 Para      CS-LTranv   ABDULLAHI   2 Para      CS-LTranv   ABDULLAHI   1 Para      Vag-Spont   ABDULLAHI     Social History    Tobacco Use      Smoking status: Never      Smokeless tobacco: Never      Current Facility-Administered Medications:     diphenhydrAMINE (BENADRYL)  capsule 25 mg, 25 mg, Oral, Nightly PRN **OR** diphenhydrAMINE (BENADRYL) injection 25 mg, 25 mg, Intravenous, Nightly PRN, Ana Vargas MD    docusate sodium (COLACE) capsule 100 mg, 100 mg, Oral, BID PRN, Ana Vargas MD    estradiol (CLIMARA) 0.05 MG/24HR patch 1 patch, 1 patch, Transdermal, Weekly, Ana Vargas MD, 1 patch at 05/09/24 1728    gabapentin (NEURONTIN) capsule 100 mg, 100 mg, Oral, TID, Ana Vargas MD, 100 mg at 05/10/24 0838    HYDROmorphone (DILAUDID) injection 0.5 mg, 0.5 mg, Intravenous, Q2H PRN **AND** naloxone (NARCAN) injection 0.1 mg, 0.1 mg, Intravenous, Q5 Min PRN, Ana Vargas MD    lamoTRIgine (LaMICtal) tablet 300 mg, 300 mg, Oral, BID, Endy Ellis MD, 300 mg at 05/10/24 0838    meloxicam (MOBIC) tablet 7.5 mg, 7.5 mg, Oral, Daily, Ana Vargas MD, 7.5 mg at 05/10/24 0838    metoprolol succinate XL (TOPROL-XL) 24 hr tablet 25 mg, 25 mg, Oral, Nightly, Ani Mclaughlin MD, 25 mg at 05/09/24 2055    oxyCODONE (ROXICODONE) immediate release tablet 5 mg, 5 mg, Oral, Q4H PRN, Ana Vargas MD, 5 mg at 05/10/24 0611    oxyCODONE-acetaminophen (PERCOCET)  MG per tablet 1 tablet, 1 tablet, Oral, Q4H PRN, Ana Vargas MD    simethicone (MYLICON) chewable tablet 80 mg, 80 mg, Oral, 4x Daily PRN, Ana Vargas MD    sodium chloride 0.9 % bolus 500 mL, 500 mL, Intravenous, Once PRN, Ana Vargas MD    sodium chloride 0.9 % infusion, 125 mL/hr, Intravenous, Continuous, Ana Vargas MD, Last Rate: 125 mL/hr at 05/10/24 0841, 125 mL/hr at 05/10/24 0841    No Known Allergies    Review of Systems   Constitutional:  Negative for chills and fever.   HENT:  Negative for congestion and sore throat.    Respiratory:  Negative for shortness of breath and wheezing.    Cardiovascular:  Negative for chest pain and palpitations.   Gastrointestinal:  Negative for abdominal pain, nausea and vomiting.   Genitourinary:  Positive for vaginal bleeding. Negative for frequency  "and vaginal pain.   Musculoskeletal:  Negative for back pain and myalgias.   Neurological:  Positive for dizziness, syncope and light-headedness. Negative for headaches.   Psychiatric/Behavioral:  Negative for confusion. The patient is not nervous/anxious.          Objective   /72 (BP Location: Left arm, Patient Position: Lying)   Pulse 77   Temp 98.8 °F (37.1 °C)   Resp 18   Ht 170.2 cm (67.01\")   Wt 78 kg (171 lb 15.3 oz)   LMP 05/09/2024 (Exact Date)   SpO2 97%   BMI 26.93 kg/m²     Orders were provided over the phone.   Labs  ABO & Rh: No results found for: \"LABABO\", \"LABRH\", \"ABID\"  CBC w/ diff:   Lab Results   Component Value Date    WBC 7.48 05/10/2024    NEUTRORELPCT 74.7 05/10/2024    AUTOIGPER 0.5 05/10/2024    LYMPHORELPCT 17.4 (L) 05/10/2024    MONORELPCT 6.4 05/10/2024    EOSRELPCT 0.8 05/10/2024    BASORELPCT 0.2 05/10/2024    HGB 7.3 (L) 05/10/2024    HCT 24.0 (L) 05/10/2024    MCV 93.0 05/10/2024    RDW 15.8 (H) 05/10/2024     05/10/2024     CMP:   Lab Results   Component Value Date     05/10/2024    K 4.2 05/10/2024     (H) 05/10/2024    CO2 24.0 05/10/2024    BUN 7 05/10/2024    CREATININE 0.89 05/10/2024    GLUCOSE 103 (H) 05/10/2024    ALBUMIN 3.7 05/09/2024    CALCIUM 7.4 (L) 05/10/2024    AST 12 05/09/2024    ALT 9 05/09/2024    BILITOT 0.7 05/09/2024     Coags:   Lab Results   Component Value Date    PROTIME 14.6 (H) 05/09/2024    INR 1.13 (H) 05/09/2024    PTT 27.5 05/09/2024    FIBRINOGEN 200 (L) 05/09/2024     UA:  No results found for: \"SQUAMEPIUA\", \"SPECGRAVUR\", \"KETONESU\", \"BLOODU\", \"LEUKOCYTESUR\", \"NITRITEU\", \"RBCUA\", \"WBCUA\", \"BACTERIA\"  Urine Culture: No results found for: \"URINECX\"       Assessment   Acute episode of vaginal bleeding in the setting of AUB-L     Plan   Admit, 2 Ivs, 2 units of pRBCs, 1 gram TXA. NPO. Evaluate for possible surgical intervention today.     Ani Mclaughlin MD    "

## 2024-05-10 NOTE — CASE MANAGEMENT/SOCIAL WORK
Case Management Discharge Note      Final Note: SW'er met with patinet and  at bedside. No discharge needs. Plan is home with  transporting.         Selected Continued Care - Admitted Since 5/9/2024       Destination    No services have been selected for the patient.                Durable Medical Equipment    No services have been selected for the patient.                Dialysis/Infusion    No services have been selected for the patient.                Home Medical Care    No services have been selected for the patient.                Therapy    No services have been selected for the patient.                Community Resources    No services have been selected for the patient.                Community & DME    No services have been selected for the patient.                         Final Discharge Disposition Code: 01 - home or self-care

## 2024-05-10 NOTE — DISCHARGE SUMMARY
[place]  Mayelin Penn  : 1975  MRN: [id number]  CSN: [id number]    Discharge Summary      Date of Admission: 2024   Date of Discharge: 5/10/83889   Discharge Diagnosis: Leiomyomata  Menorrhagia with anemia requiring transfusion   Procedures Performed: Procedure(s):  TOTAL LAPAROSCOPIC HYSTERECTOMY LEFT SALPINGOOPHERECTOMY  VULVAR BIOPSY      Consults: None   Brief History and hospital course: Patient is a 48 y.o.with known uterine leiomyomata scheduled for hysterectomy 24 presented with profuse vaginal bleeding and orthostasis.  She was transfused two units PRBC and underwent emergent  TLH/LST the morning of 5/10/24.  Her postoperative course was unremarkable.  She was ambulatory, voiding without difficulty by the afternoon of 5/10/24 with stable HCT of 24.       Pending Studies: None   Condition at discharge: stable   Discharge Medications:    Your medication list        START taking these medications        Instructions Last Dose Given Next Dose Due   estradiol 0.05 MG/24HR patch  Commonly known as: CLIMARA  Start taking on: May 16, 2024      Place 1 patch on the skin as directed by provider 1 (One) Time Per Week.       oxyCODONE 5 MG immediate release tablet  Commonly known as: ROXICODONE      Take 1 tablet by mouth Every 6 (Six) Hours As Needed for Moderate Pain for up to 4 days.              CONTINUE taking these medications        Instructions Last Dose Given Next Dose Due   cholecalciferol 25 MCG (1000 UT) tablet  Commonly known as: VITAMIN D3      Take 1 tablet by mouth Daily.       ferrous sulfate 325 (65 FE) MG tablet      Take 1 tablet by mouth every night at bedtime.       lamoTRIgine 150 MG tablet  Commonly known as: LaMICtal      Take 2 tablets by mouth 2 (Two) Times a Day.       magnesium oxide 400 MG tablet  Commonly known as: MAG-OX      Take 1 tablet by mouth Daily.       medroxyPROGESTERone 10 MG tablet  Commonly known as: PROVERA      TAKE 1 TABLET BY MOUTH TWICE A  DAY       metoprolol succinate XL 25 MG 24 hr tablet  Commonly known as: TOPROL-XL      Take 1 tablet by mouth every night at bedtime.       multivitamin with minerals tablet tablet      Take 1 tablet by mouth Daily. OTC       sertraline 50 MG tablet  Commonly known as: ZOLOFT      Take 1 tablet by mouth every night at bedtime.       Tranexamic Acid 650 MG tablet  Commonly known as: Lysteda      2 tablets by mouth 3 times daily for 5 days                 Where to Get Your Medications        These medications were sent to [facility] [place] [id number]      Hours: Monday to Friday 7 AM to 5:30 PM, Saturday & Sunday 8 AM to 4:30 PM Phone: [phone]   estradiol 0.05 MG/24HR patch  oxyCODONE 5 MG immediate release tablet       These medications were sent to [id number] [place], [place] - [address] [place] [address] - [phone] [facility] [phone] [address] [address]      Phone: [phone]   medroxyPROGESTERone 10 MG tablet        Discharge Disposition: home   Follow-up: Future Appointments   Date Time Provider Department Center   7/3/2024  9:30 AM Ana Vargas MD [facility]   7/11/2024  9:40 AM Ana Vargas MD [facility]   7/11/2024 11:30 AM [facility] 1 [facility]   7/25/2024 10:50 AM Ana Vargas MD [facility]            This note has been electronically signed.    Ana Vargas MD  May 10, 2024

## 2024-05-10 NOTE — PLAN OF CARE
Goal Outcome Evaluation:               VSS on RA. Pain managed with scheduled meds. Discharge information packet sent home with patient. Discharge education included medication and medication side effects, provider follow-up, signs/symptoms of infection, activity restrictions, and dressing care. Care ongoing, continue plan of care.

## 2024-05-13 LAB
BH BB BLOOD EXPIRATION DATE: NORMAL
BH BB BLOOD TYPE BARCODE: 6200
BH BB BLOOD TYPE BARCODE: 6200
BH BB BLOOD TYPE BARCODE: 8400
BH BB BLOOD TYPE BARCODE: 8400
BH BB DISPENSE STATUS: NORMAL
BH BB PRODUCT CODE: NORMAL
BH BB UNIT NUMBER: NORMAL
CROSSMATCH INTERPRETATION: NORMAL
CYTO UR: NORMAL
LAB AP CASE REPORT: NORMAL
LAB AP CLINICAL INFORMATION: NORMAL
PATH REPORT.FINAL DX SPEC: NORMAL
PATH REPORT.GROSS SPEC: NORMAL
UNIT  ABO: NORMAL
UNIT  RH: NORMAL

## 2024-05-14 ENCOUNTER — TELEPHONE (OUTPATIENT)
Dept: OBSTETRICS AND GYNECOLOGY | Facility: CLINIC | Age: 49
End: 2024-05-14
Payer: COMMERCIAL

## 2024-05-14 NOTE — TELEPHONE ENCOUNTER
PATIENTS IS NEEDING A DOCTORS FOR WORK STATING SHE HAD SURGERY ON 05.09. FAX: [phone] SHELLY SAMAYOA

## 2024-05-14 NOTE — TELEPHONE ENCOUNTER
Returned patient's call. S/P TLH/LSO on 05/09/24. States when she left the hospital, the nurse told her to change her estradiol patch every 5 days. When she got the Rx, it states to change it every 7 days. She is asking for clarification. Discussed with Dr. Vargas; patch is to be changed every 7 days. Informed patient; she v/u.

## 2024-05-28 ENCOUNTER — OFFICE VISIT (OUTPATIENT)
Dept: OBSTETRICS AND GYNECOLOGY | Facility: CLINIC | Age: 49
End: 2024-05-28
Payer: COMMERCIAL

## 2024-05-28 VITALS
BODY MASS INDEX: 27 KG/M2 | DIASTOLIC BLOOD PRESSURE: 84 MMHG | HEIGHT: 67 IN | WEIGHT: 172 LBS | SYSTOLIC BLOOD PRESSURE: 120 MMHG

## 2024-05-28 DIAGNOSIS — Z90.710 STATUS POST LAPAROSCOPIC HYSTERECTOMY: Primary | ICD-10-CM

## 2024-05-28 PROCEDURE — 99024 POSTOP FOLLOW-UP VISIT: CPT | Performed by: OBSTETRICS & GYNECOLOGY

## 2024-05-28 NOTE — PROGRESS NOTES
OBGYN Postoperative Exam Note          Subjective   Chief Complaint   Patient presents with    Post-op     YasmeenLor Penn is a 48 y.o. year old  presenting to be seen for her post-operative visit. She is S/P TLH with left salpingo-oophorectomy and vulva biopsy on 24 at [facility] for Uterine Fibroid and menorrhagia requiring transfusion. Currently she reports no problems with eating, bowel movements, voiding, or wound drainage and pain is well controlled.    The results were discussed with Mayelin.    OTHER THINGS SHE WANTS TO DISCUSS TODAY:  Nothing else      Current Outpatient Medications:     Cholecalciferol 25 MCG (1000 UT) tablet, Take 1 tablet by mouth Daily., Disp: , Rfl:     estradiol (CLIMARA) 0.05 MG/24HR patch, Place 1 patch on the skin as directed by provider 1 (One) Time Per Week., Disp: 4 patch, Rfl: 3    ferrous sulfate 325 (65 FE) MG tablet, Take 1 tablet by mouth every night at bedtime., Disp: , Rfl:     lamoTRIgine (LaMICtal) 150 MG tablet, Take 2 tablets by mouth 2 (Two) Times a Day., Disp: , Rfl:     metoprolol succinate XL (TOPROL-XL) 25 MG 24 hr tablet, Take 1 tablet by mouth every night at bedtime., Disp: , Rfl:     multivitamin with minerals tablet tablet, Take 1 tablet by mouth Daily. OTC, Disp: , Rfl:     sertraline (ZOLOFT) 50 MG tablet, Take 1 tablet by mouth every night at bedtime., Disp: , Rfl:      Past Medical History:   Diagnosis Date    Abnormal Pap smear of cervix     Anxiety     Bilateral ovarian cysts     Leiomyoma of uterus     Seizures     Dr. Parisi last seizure 2024    Tachycardia         Past Surgical History:   Procedure Laterality Date    APPENDECTOMY      CERVICAL BIOPSY  2019    benign, negative for dysplasia or carcinoma    CERVICAL CONIZATION       SECTION       SECTION PRIMARY      DIAGNOSTIC LAPAROSCOPY, SALPINGO OOPHORECTOMY LAPAROSCOPIC N/A 11/10/2021    Procedure: REMOVAL OF RIGHT OVARIAN MASS,  "LAPROSCOPIC;  Surgeon: Ana Vargas MD;  Location:  [facility];  Service: Obstetrics/Gynecology;  Laterality: N/A;    TOTAL LAPAROSCOPIC HYSTERECTOMY N/A 5/9/2024    Procedure: TOTAL LAPAROSCOPIC HYSTERECTOMY LEFT SALPINGOOPHERECTOMY;  Surgeon: Ana Vargas MD;  Location:  [facility];  Service: Obstetrics/Gynecology;  Laterality: N/A;    TUBAL ABDOMINAL LIGATION      VULVA BIOPSY Right 5/9/2024    Procedure: VULVA BIOPSY;  Surgeon: Ana Vargas MD;  Location:  [facility];  Service: Obstetrics/Gynecology;  Laterality: Right;       The following portions of the patient's history were reviewed and updated as appropriate:current medications and allergies    Review of Systems   Constitutional: Negative.    HENT: Negative.     Eyes: Negative.    Respiratory: Negative.     Cardiovascular: Negative.    Gastrointestinal: Negative.    Endocrine: Negative.    Genitourinary: Negative.    Musculoskeletal: Negative.    Skin: Negative.    Allergic/Immunologic: Negative.    Neurological: Negative.    Hematological: Negative.    Psychiatric/Behavioral: Negative.            Objective   /84   Ht 170.2 cm (67\")   Wt 78 kg (172 lb)   LMP 05/09/2024 (Exact Date)   BMI 26.94 kg/m²     Physical Exam  Vitals and nursing note reviewed.   Constitutional:       Appearance: She is well-developed.   HENT:      Head: Normocephalic and atraumatic.   Pulmonary:      Effort: Pulmonary effort is normal.   Abdominal:      General: A surgical scar is present.      Palpations: Abdomen is soft. Abdomen is not rigid.      Comments: Clean, Dry, and Intact.  No erythema.    Musculoskeletal:      Cervical back: Normal range of motion.   Neurological:      Mental Status: She is alert and oriented to person, place, and time.   Psychiatric:         Mood and Affect: Mood normal.         Behavior: Behavior normal.              Assessment   S/P TLH/BS     Plan   AVoid intercourse 8 more weeks.  Pathology was reviewed with patient  The importance of " keeping all planned follow-up and taking all medications as prescribed was emphasized.  Return in about 5 weeks (around 7/2/2024).              Ana Vargas MD  05/28/2024

## 2024-05-30 ENCOUNTER — TELEPHONE (OUTPATIENT)
Dept: OBSTETRICS AND GYNECOLOGY | Facility: CLINIC | Age: 49
End: 2024-05-30
Payer: COMMERCIAL

## 2024-05-30 DIAGNOSIS — Z79.890 HORMONE REPLACEMENT THERAPY: Primary | ICD-10-CM

## 2024-05-30 RX ORDER — ESTRADIOL 0.05 MG/D
1 PATCH TRANSDERMAL WEEKLY
Qty: 4 PATCH | Refills: 2 | Status: SHIPPED | OUTPATIENT
Start: 2024-05-30

## 2024-05-30 NOTE — TELEPHONE ENCOUNTER
estradiol (CLIMARA) 0.05 MG/24HR patch (05/16/2024)    Pt needs this rx sent to [facility] in [place] that is on file

## 2024-06-06 ENCOUNTER — TELEPHONE (OUTPATIENT)
Dept: OBSTETRICS AND GYNECOLOGY | Facility: CLINIC | Age: 49
End: 2024-06-06
Payer: COMMERCIAL

## 2024-06-06 NOTE — TELEPHONE ENCOUNTER
"From Up to Date: \"Hormone replacement therapy and hormonal contraceptives increase lamotrigine clearance and are associated with decreased blood levels [104-110]. This can result in increased concentrations during the \"placebo\" week used with many oral contraceptives, with decreases when the active drug is resumed. This effect appears to be limited to combined estrogen-progestin contraceptives; progestin-only contraceptives have not been found to alter lamotrigine levels [109,110]. Conversely, lamotrigine may reduce the effectiveness of combined estrogen-progestin contraceptives (table 6) [111]. (See \"Overview of the management of epilepsy in adults\", section on 'Contraception'.)\"   Discussed with Dr. Vargas. She stated it is okay for patient to use Climara patch while taking Lamictal. Patient may need to speak with her neurologist to discuss whether Lamictal dose may need to be increased with use of Climara patch.   Returned patient's call.   Informed her of Dr. Vargas' recommendations. Encouraged her to contact her neurologist and let them know she is starting the Climara patch. She v/u and agreed.   "

## 2024-06-06 NOTE — TELEPHONE ENCOUNTER
estradiol (CLIMARA) 0.05 MG/24HR patch (05/30/2024)     lamoTRIgine (LaMICtal) 150 MG tablet     Pt was told by her pharmacist that these meds are not to be take together. She is requesting

## 2024-06-10 ENCOUNTER — TELEPHONE (OUTPATIENT)
Dept: OBSTETRICS AND GYNECOLOGY | Facility: CLINIC | Age: 49
End: 2024-06-10
Payer: COMMERCIAL

## 2024-06-10 NOTE — TELEPHONE ENCOUNTER
PATIENT STATES SHE WOULD LIKE A CALL BACK TO DISCUSS HER HYSTERECTOMY AND WEARING A PATCH.    CALL BACK [phone]

## 2024-06-10 NOTE — TELEPHONE ENCOUNTER
Patient of Dr. Vargas; LOV 05/28/24 for post op visit.   Returned patient's call.   States her Climara patch was loosened in the center after showering. She was able to press it back into place. Asking if she needs to change it.   Informed her that if it is staying in place now, she does not need to change it. She can add some tape over it, if needed, to help keep it in place. She v/u and agreed.

## 2024-06-11 ENCOUNTER — OFFICE VISIT (OUTPATIENT)
Dept: OBSTETRICS AND GYNECOLOGY | Facility: CLINIC | Age: 49
End: 2024-06-11
Payer: COMMERCIAL

## 2024-06-11 ENCOUNTER — HOSPITAL ENCOUNTER (EMERGENCY)
Facility: HOSPITAL | Age: 49
Discharge: HOME | End: 2024-06-11
Attending: EMERGENCY MEDICINE | Admitting: EMERGENCY MEDICINE
Payer: COMMERCIAL

## 2024-06-11 ENCOUNTER — TELEPHONE (OUTPATIENT)
Dept: OBSTETRICS AND GYNECOLOGY | Facility: CLINIC | Age: 49
End: 2024-06-11
Payer: COMMERCIAL

## 2024-06-11 ENCOUNTER — APPOINTMENT (OUTPATIENT)
Dept: CT IMAGING | Facility: HOSPITAL | Age: 49
End: 2024-06-11
Payer: COMMERCIAL

## 2024-06-11 VITALS
RESPIRATION RATE: 16 BRPM | HEART RATE: 62 BPM | HEIGHT: 67 IN | SYSTOLIC BLOOD PRESSURE: 121 MMHG | TEMPERATURE: 98.3 F | DIASTOLIC BLOOD PRESSURE: 75 MMHG | WEIGHT: 170 LBS | BODY MASS INDEX: 26.68 KG/M2 | OXYGEN SATURATION: 98 %

## 2024-06-11 VITALS
WEIGHT: 172.4 LBS | SYSTOLIC BLOOD PRESSURE: 132 MMHG | BODY MASS INDEX: 27.06 KG/M2 | HEIGHT: 67 IN | DIASTOLIC BLOOD PRESSURE: 88 MMHG

## 2024-06-11 DIAGNOSIS — N93.9 VAGINAL BLEEDING: ICD-10-CM

## 2024-06-11 DIAGNOSIS — N93.9 VAGINAL BLEEDING: Primary | ICD-10-CM

## 2024-06-11 DIAGNOSIS — Z90.710 STATUS POST LAPAROSCOPIC HYSTERECTOMY: Primary | ICD-10-CM

## 2024-06-11 LAB
ANION GAP SERPL CALCULATED.3IONS-SCNC: 8 MMOL/L (ref 5–15)
BASOPHILS # BLD AUTO: 0.04 10*3/MM3 (ref 0–0.2)
BASOPHILS NFR BLD AUTO: 0.7 % (ref 0–1.5)
BUN SERPL-MCNC: 11 MG/DL (ref 6–20)
BUN/CREAT SERPL: 11 (ref 7–25)
CALCIUM SPEC-SCNC: 9.6 MG/DL (ref 8.6–10.5)
CHLORIDE SERPL-SCNC: 103 MMOL/L (ref 98–107)
CO2 SERPL-SCNC: 30 MMOL/L (ref 22–29)
CREAT SERPL-MCNC: 1 MG/DL (ref 0.57–1)
DEPRECATED RDW RBC AUTO: 45.2 FL (ref 37–54)
EGFRCR SERPLBLD CKD-EPI 2021: 70.5 ML/MIN/1.73
EOSINOPHIL # BLD AUTO: 0.36 10*3/MM3 (ref 0–0.4)
EOSINOPHIL NFR BLD AUTO: 6.1 % (ref 0.3–6.2)
ERYTHROCYTE [DISTWIDTH] IN BLOOD BY AUTOMATED COUNT: 13.7 % (ref 12.3–15.4)
GLUCOSE SERPL-MCNC: 102 MG/DL (ref 65–99)
HCT VFR BLD AUTO: 39.4 % (ref 34–46.6)
HGB BLD-MCNC: 12.2 G/DL (ref 12–15.9)
HOLD SPECIMEN: NORMAL
IMM GRANULOCYTES # BLD AUTO: 0.02 10*3/MM3 (ref 0–0.05)
IMM GRANULOCYTES NFR BLD AUTO: 0.3 % (ref 0–0.5)
LYMPHOCYTES # BLD AUTO: 1.17 10*3/MM3 (ref 0.7–3.1)
LYMPHOCYTES NFR BLD AUTO: 19.7 % (ref 19.6–45.3)
MCH RBC QN AUTO: 28 PG (ref 26.6–33)
MCHC RBC AUTO-ENTMCNC: 31 G/DL (ref 31.5–35.7)
MCV RBC AUTO: 90.4 FL (ref 79–97)
MONOCYTES # BLD AUTO: 0.2 10*3/MM3 (ref 0.1–0.9)
MONOCYTES NFR BLD AUTO: 3.4 % (ref 5–12)
NEUTROPHILS NFR BLD AUTO: 4.15 10*3/MM3 (ref 1.7–7)
NEUTROPHILS NFR BLD AUTO: 69.8 % (ref 42.7–76)
NRBC BLD AUTO-RTO: 0 /100 WBC (ref 0–0.2)
PLATELET # BLD AUTO: 254 10*3/MM3 (ref 140–450)
PMV BLD AUTO: 9.3 FL (ref 6–12)
POTASSIUM SERPL-SCNC: 4 MMOL/L (ref 3.5–5.2)
RBC # BLD AUTO: 4.36 10*6/MM3 (ref 3.77–5.28)
SODIUM SERPL-SCNC: 141 MMOL/L (ref 136–145)
WBC NRBC COR # BLD AUTO: 5.94 10*3/MM3 (ref 3.4–10.8)
WHOLE BLOOD HOLD COAG: NORMAL
WHOLE BLOOD HOLD SPECIMEN: NORMAL

## 2024-06-11 PROCEDURE — 80048 BASIC METABOLIC PNL TOTAL CA: CPT | Performed by: EMERGENCY MEDICINE

## 2024-06-11 PROCEDURE — 99285 EMERGENCY DEPT VISIT HI MDM: CPT

## 2024-06-11 PROCEDURE — 85025 COMPLETE CBC W/AUTO DIFF WBC: CPT | Performed by: EMERGENCY MEDICINE

## 2024-06-11 PROCEDURE — 25510000001 IOPAMIDOL 61 % SOLUTION: Performed by: EMERGENCY MEDICINE

## 2024-06-11 PROCEDURE — 99024 POSTOP FOLLOW-UP VISIT: CPT | Performed by: OBSTETRICS & GYNECOLOGY

## 2024-06-11 PROCEDURE — 74177 CT ABD & PELVIS W/CONTRAST: CPT

## 2024-06-11 RX ORDER — CONJUGATED ESTROGENS 0.62 MG/G
CREAM VAGINAL DAILY
Qty: 30 G | Refills: 1 | Status: SHIPPED | OUTPATIENT
Start: 2024-06-11 | End: 2024-06-12

## 2024-06-11 RX ORDER — PSYLLIUM SEED (WITH DEXTROSE)
POWDER (GRAM) ORAL DAILY
COMMUNITY

## 2024-06-11 RX ORDER — SODIUM CHLORIDE 0.9 % (FLUSH) 0.9 %
10 SYRINGE (ML) INJECTION AS NEEDED
Status: DISCONTINUED | OUTPATIENT
Start: 2024-06-11 | End: 2024-06-11 | Stop reason: HOSPADM

## 2024-06-11 RX ORDER — MAGNESIUM OXIDE 400 MG/1
400 TABLET ORAL DAILY
COMMUNITY

## 2024-06-11 RX ADMIN — IOPAMIDOL 75 ML: 612 INJECTION, SOLUTION INTRAVENOUS at 05:02

## 2024-06-11 NOTE — TELEPHONE ENCOUNTER
Pt was in the ED last Sunday and is continuing to experience light bleeding. No fever, nausea or pain

## 2024-06-11 NOTE — DISCHARGE INSTRUCTIONS
Drink plenty of clear liquids, avoid straining or lifting over 10 lb until cleared by OBGYN, continue pelvic rest as directed/nothing in your vagina until cleared by OBGYN.

## 2024-06-11 NOTE — ED PROVIDER NOTES
Subjective   History of Present Illness  48 year old female presents to the emergency department accompanied by her  with concerns about vaginal bleeding. She had a laparoscopic hysterectomy on 24 for menorrhagia with uterine leiomyomata with anemia by Dr. Vargas. She had her left ovary and fallopian tube removed at that time also. She had been doing well until last night (6/10/24) at approximately 2100, she had taken a walk and upon returning home noticed vaginal spotting, some light vaginal bleeding. She went to bed and awoke shortly prior to arrival with more significant vaginal bleeding like a period. She reports compliance with recommended pelvic rest, but reports she had strained to have a bowel movement recently due to some constipation. She denies use of anticoagulants. She denies acute pain or recent fever. She reports compliance with iron supplements which she believes contributed to the constipation.       Review of Systems   Constitutional:  Negative for diaphoresis and fever.   HENT:  Negative for facial swelling and nosebleeds.    Eyes:  Negative for photophobia and discharge.   Respiratory:  Negative for stridor.    Cardiovascular:  Negative for leg swelling.   Gastrointestinal:  Positive for constipation. Negative for abdominal pain.   Genitourinary:  Positive for vaginal bleeding. Negative for hematuria and pelvic pain.   Neurological:  Negative for facial asymmetry and speech difficulty.       Past Medical History:   Diagnosis Date    Abnormal Pap smear of cervix     Anxiety     Bilateral ovarian cysts     Leiomyoma of uterus     Seizures     Dr. Parisi last seizure 2024    Tachycardia        No Known Allergies    Past Surgical History:   Procedure Laterality Date    APPENDECTOMY      CERVICAL BIOPSY  2019    benign, negative for dysplasia or carcinoma    CERVICAL CONIZATION       SECTION       SECTION PRIMARY      DIAGNOSTIC LAPAROSCOPY, SALPINGO  OOPHORECTOMY LAPAROSCOPIC N/A 11/10/2021    Procedure: REMOVAL OF RIGHT OVARIAN MASS, LAPROSCOPIC;  Surgeon: Ana Vargas MD;  Location:  [facility];  Service: Obstetrics/Gynecology;  Laterality: N/A;    TOTAL LAPAROSCOPIC HYSTERECTOMY N/A 5/9/2024    Procedure: TOTAL LAPAROSCOPIC HYSTERECTOMY LEFT SALPINGOOPHERECTOMY;  Surgeon: Ana Vargas MD;  Location:  [facility];  Service: Obstetrics/Gynecology;  Laterality: N/A;    TUBAL ABDOMINAL LIGATION      VULVA BIOPSY Right 5/9/2024    Procedure: VULVA BIOPSY;  Surgeon: Ana Vargas MD;  Location:  [facility];  Service: Obstetrics/Gynecology;  Laterality: Right;       Family History   Problem Relation Age of Onset    Breast cancer Neg Hx     Ovarian cancer Neg Hx     Uterine cancer Neg Hx     Colon cancer Neg Hx        Social History     Socioeconomic History    Marital status:    Tobacco Use    Smoking status: Never    Smokeless tobacco: Never   Vaping Use    Vaping status: Never Used   Substance and Sexual Activity    Alcohol use: Never    Drug use: Never    Sexual activity: Yes     Partners: Male     Birth control/protection: Surgical           Objective   Physical Exam  Vitals and nursing note reviewed.   Constitutional:       General: She is not in acute distress.     Appearance: She is not diaphoretic.      Comments: BMI 26.   HENT:      Mouth/Throat:      Comments: Moist mucosa without pallor.  Eyes:      General: No scleral icterus.     Comments: No photophobia   Pulmonary:      Effort: Pulmonary effort is normal. No respiratory distress.      Breath sounds: No stridor.   Abdominal:      General: There is no distension.      Palpations: Abdomen is soft.      Tenderness: There is no abdominal tenderness. There is no guarding.   Genitourinary:     Comments: External genitalia within normal limits. Blood in vaginal vault. Very gentle speculum exam performed to confirm source of bleeding was the vaginal.  Vaginal cuff very gently evaluated by bimanual  exam, no defect is appreciated in the vaginal cuff. A clot the size of a hard boiled egg was removed from the vagina. Zina paramedic/nursing student chaperoned the examination.   Musculoskeletal:      Cervical back: Neck supple. No rigidity.   Skin:     General: Skin is warm and dry.      Coloration: Skin is not jaundiced or pale.   Neurological:      Mental Status: She is alert.      Comments: Normal speech, no dysarthria. No facial droop.         Procedures           ED Course  ED Course as of 06/11/24 0544   Tue Jun 11, 2024   0428 Hemoglobin: 12.2 [LD]   0428 WBC: 5.94 [LD]   0525 OBGYN Dr. Mclaughlin paged through [facility].  [LD]   6723 I discussed the patient with Dr. Mclaughlin on-call for Dr. Vargas, and Dr. Mclaughlin recommends that the patient call Dr. Vargas office this morning for close follow-up for her postoperative vaginal bleeding.  Results and plan discussed with patient and her  at bedside.  All questions addressed.  [LD]      ED Course User Index  [LD] Donita Urbina MD                                             Medical Decision Making  Differential diagnosis includes post operative complication, post operative bleeding, postoperative abscess, anemia, wound dehiscence, and others.     Problems Addressed:  Vaginal bleeding: complicated acute illness or injury    Amount and/or Complexity of Data Reviewed  Independent Historian: spouse     Details: At bedside  External Data Reviewed: labs and notes.     Details: Prior hemoglobin values reviewed. Op note from 5/9/24 reviewed. Discharge summary from 5/10/24 reviewed.   Labs: ordered. Decision-making details documented in ED Course.  Radiology: ordered. Decision-making details documented in ED Course.    Risk  Prescription drug management.      Recent Results (from the past 24 hour(s))   Green Top (Gel)    Collection Time: 06/11/24  4:07 AM   Result Value Ref Range    Extra Tube Hold for add-ons.    Lavender Top    Collection Time: 06/11/24  4:07  AM   Result Value Ref Range    Extra Tube hold for add-on    Gold Top - SST    Collection Time: 06/11/24  4:07 AM   Result Value Ref Range    Extra Tube Hold for add-ons.    Gray Top    Collection Time: 06/11/24  4:07 AM   Result Value Ref Range    Extra Tube Hold for add-ons.    Light Blue Top    Collection Time: 06/11/24  4:07 AM   Result Value Ref Range    Extra Tube Hold for add-ons.    Basic Metabolic Panel    Collection Time: 06/11/24  4:07 AM    Specimen: Blood   Result Value Ref Range    Glucose 102 (H) 65 - 99 mg/dL    BUN 11 6 - 20 mg/dL    Creatinine 1.00 0.57 - 1.00 mg/dL    Sodium 141 136 - 145 mmol/L    Potassium 4.0 3.5 - 5.2 mmol/L    Chloride 103 98 - 107 mmol/L    CO2 30.0 (H) 22.0 - 29.0 mmol/L    Calcium 9.6 8.6 - 10.5 mg/dL    BUN/Creatinine Ratio 11.0 7.0 - 25.0    Anion Gap 8.0 5.0 - 15.0 mmol/L    eGFR 70.5 >60.0 mL/min/1.73   CBC Auto Differential    Collection Time: 06/11/24  4:07 AM    Specimen: Blood   Result Value Ref Range    WBC 5.94 3.40 - 10.80 10*3/mm3    RBC 4.36 3.77 - 5.28 10*6/mm3    Hemoglobin 12.2 12.0 - 15.9 g/dL    Hematocrit 39.4 34.0 - 46.6 %    MCV 90.4 79.0 - 97.0 fL    MCH 28.0 26.6 - 33.0 pg    MCHC 31.0 (L) 31.5 - 35.7 g/dL    RDW 13.7 12.3 - 15.4 %    RDW-SD 45.2 37.0 - 54.0 fl    MPV 9.3 6.0 - 12.0 fL    Platelets 254 140 - 450 10*3/mm3    Neutrophil % 69.8 42.7 - 76.0 %    Lymphocyte % 19.7 19.6 - 45.3 %    Monocyte % 3.4 (L) 5.0 - 12.0 %    Eosinophil % 6.1 0.3 - 6.2 %    Basophil % 0.7 0.0 - 1.5 %    Immature Grans % 0.3 0.0 - 0.5 %    Neutrophils, Absolute 4.15 1.70 - 7.00 10*3/mm3    Lymphocytes, Absolute 1.17 0.70 - 3.10 10*3/mm3    Monocytes, Absolute 0.20 0.10 - 0.90 10*3/mm3    Eosinophils, Absolute 0.36 0.00 - 0.40 10*3/mm3    Basophils, Absolute 0.04 0.00 - 0.20 10*3/mm3    Immature Grans, Absolute 0.02 0.00 - 0.05 10*3/mm3    nRBC 0.0 0.0 - 0.2 /100 WBC     Note: In addition to lab results from this visit, the labs listed above may include labs taken at  another facility or during a different encounter within the last 24 hours. Please correlate lab times with ED admission and discharge times for further clarification of the services performed during this visit.    CT Abdomen Pelvis With Contrast   Final Result   Impression:   1.Status post hysterectomy. There is no evidence of pelvic abscess. There is trace fluid in the vaginal cuff.   2.Cholelithiasis.   3.Constipation.            Electronically Signed: Efrain Valencia MD     6/11/2024 5:19 AM EDT     Workstation ID: [id number]        Vitals:    06/11/24 0512 06/11/24 0520 06/11/24 0521 06/11/24 0541   BP: 131/87 134/88     BP Location:       Patient Position:       Pulse: 67  61 61   Resp:       Temp:       TempSrc:       SpO2: 94%  99% 98%   Weight:       Height:         Medications   sodium chloride 0.9 % flush 10 mL (has no administration in time range)   iopamidol (ISOVUE-300) 61 % injection 100 mL (75 mL Intravenous Given 6/11/24 0502)     ECG/EMG Results (last 24 hours)       ** No results found for the last 24 hours. **          No orders to display     Discharge instructions include:  Drink plenty of clear liquids, avoid straining or lifting over 10 lb until cleared by OBGYN, continue pelvic rest as directed/nothing in your vagina until cleared by OBGYN.       Final diagnoses:   Vaginal bleeding       ED Disposition  ED Disposition       ED Disposition   Discharge    Condition   Stable    Comment   --               Saqib Casper MD  [facility] [address] [place] [address] [phone]    Schedule an appointment as soon as possible for a visit in 1 week  primary care provider    Ana Vargas MD  [address] [facility] [facility] [address]  [facility] [address] [phone]    Call today  for close follow up/recheck for post operative vaginal bleeding after laparoscopic hysterectomy, per discussion with Dr. Mclaughlin today by phone from the ER.         Medication List      No changes were made to your prescriptions  during this visit.            Donita Urbina MD  06/11/24 0580

## 2024-06-11 NOTE — TELEPHONE ENCOUNTER
Patient of Dr. Vargas; had LH/LSO 05/09/24.   Returned patient's call.   She was seen in [facility] ER last night with vaginal bleeding and passed some golf ball sized clots. States they checked the vaginal cuff and it was intact.   Reports she takes iron supplement once daily and has been having constipation. Was using Miralax. Reports has been straining for past few weeks.   Still having light-moderate amount of red bleeding today.   Denies any pain but does have lots of gas.   Discussed constipation management.

## 2024-06-11 NOTE — PROGRESS NOTES
OBGYN Postoperative Exam Note          Subjective   Chief Complaint   Patient presents with    Post-op     Mayelin Penn is a 48 y.o. year old  presenting to be seen for vaginal bleeding 4 1/2 weeks post-operative. She is S/P TLH with left salpingo-oophorectomy on 24 at [facility] for Uterine Fibroids and and menorrhagia requiring transfusion . Patient called today stating that she got up to go to the bathroom and had blood run down her leg and passed 4-5 golf ball sized clots. She reported that bleeding had slowed down, but she was still bleeding. Patient was instructed to insert a tampon and an appointment was scheduled. Patient was also seen in the ED early this morning for vaginal bleeding after a walk and straining with a BM earlier in the night. Patient states that she has been struggling with constipation. Patient states that she tried, but was not able to insert the tampon as directed. Patein denies any bleeding currently.        OTHER THINGS SHE WANTS TO DISCUSS TODAY:  Nothing else      Current Outpatient Medications:     Cholecalciferol 25 MCG (1000 UT) tablet, Take 1 tablet by mouth Daily., Disp: , Rfl:     estradiol (CLIMARA) 0.05 MG/24HR patch, Place 1 patch on the skin as directed by provider 1 (One) Time Per Week., Disp: 4 patch, Rfl: 2    ferrous sulfate 325 (65 FE) MG tablet, Take 1 tablet by mouth every night at bedtime., Disp: , Rfl:     lamoTRIgine (LaMICtal) 150 MG tablet, Take 2 tablets by mouth 2 (Two) Times a Day., Disp: , Rfl:     magnesium oxide (MAG-OX) 400 MG tablet, Take 1 tablet by mouth Daily., Disp: , Rfl:     metoprolol succinate XL (TOPROL-XL) 25 MG 24 hr tablet, Take 1 tablet by mouth every night at bedtime., Disp: , Rfl:     multivitamin with minerals tablet tablet, Take 1 tablet by mouth Daily. OTC, Disp: , Rfl:     Psyllium (Metamucil) wafer wafer, Take  by mouth Daily., Disp: , Rfl:     sertraline (ZOLOFT) 50 MG tablet, Take 1 tablet by mouth every  "night at bedtime., Disp: , Rfl:   No current facility-administered medications for this visit.     Past Medical History:   Diagnosis Date    Abnormal Pap smear of cervix     Anxiety     Bilateral ovarian cysts     Leiomyoma of uterus     Seizures     Dr. Parisi last seizure 2024    Tachycardia         Past Surgical History:   Procedure Laterality Date    APPENDECTOMY      CERVICAL BIOPSY  2019    benign, negative for dysplasia or carcinoma    CERVICAL CONIZATION       SECTION       SECTION PRIMARY      DIAGNOSTIC LAPAROSCOPY, SALPINGO OOPHORECTOMY LAPAROSCOPIC N/A 11/10/2021    Procedure: REMOVAL OF RIGHT OVARIAN MASS, LAPROSCOPIC;  Surgeon: Ana Vargas MD;  Location: [facility] OR;  Service: Obstetrics/Gynecology;  Laterality: N/A;    TOTAL LAPAROSCOPIC HYSTERECTOMY N/A 2024    Procedure: TOTAL LAPAROSCOPIC HYSTERECTOMY LEFT SALPINGOOPHERECTOMY;  Surgeon: Ana Vargas MD;  Location: [facility] OR;  Service: Obstetrics/Gynecology;  Laterality: N/A;    TUBAL ABDOMINAL LIGATION      VULVA BIOPSY Right 2024    Procedure: VULVA BIOPSY;  Surgeon: Ana Vargas MD;  Location: [facility] OR;  Service: Obstetrics/Gynecology;  Laterality: Right;       The following portions of the patient's history were reviewed and updated as appropriate:current medications and allergies    Review of Systems   Constitutional: Negative.    HENT: Negative.     Eyes: Negative.    Respiratory: Negative.     Cardiovascular: Negative.    Gastrointestinal: Negative.    Endocrine: Negative.    Genitourinary:  Positive for vaginal bleeding.   Musculoskeletal: Negative.    Skin: Negative.    Allergic/Immunologic: Negative.    Neurological: Negative.    Hematological: Negative.    Psychiatric/Behavioral: Negative.            Objective   /88   Ht 170.2 cm (67\")   Wt 78.2 kg (172 lb 6.4 oz)   LMP 2024 (Exact Date)   BMI 27.00 kg/m²     Physical Exam  Exam conducted with a chaperone present. "   Constitutional:       Appearance: She is well-developed.   HENT:      Head: Normocephalic.   Eyes:      Conjunctiva/sclera: Conjunctivae normal.   Pulmonary:      Effort: Pulmonary effort is normal.   Genitourinary:     General: Normal vulva.      Comments: Blood at cuff - cuff intact and granulation tissue cauterized  Psychiatric:         Behavior: Behavior normal.              Assessment   Encounter Diagnoses   Name Primary?    Status post laparoscopic hysterectomy/LSO ( previous RSO) Yes    Vaginal bleeding           Plan   No evidence of cuff dehiscence.  Cuff cauterized and will start estrogen cream.    The importance of keeping all planned follow-up and taking all medications as prescribed was emphasized.  As scheduled.             Aan Vargas MD  06/11/2024

## 2024-06-11 NOTE — TELEPHONE ENCOUNTER
Returned patient's call.   States she just got up to go to the bathroom and had blood run down her leg and passed 4-5 golf ball sized clots. Bleeding has slowed now but is still bleeding.   Discussed with Dr. Vargas. She recommends patient very gently insert a tampon to help tamponade the bleeding. She can see the patient this afternoon as a work-in but, it might be a bit of wait, or she can see patient 06/13/24.   Informed patient. She v/u and would like to be seen today. Appointment scheduled.

## 2024-06-12 ENCOUNTER — TELEPHONE (OUTPATIENT)
Dept: OBSTETRICS AND GYNECOLOGY | Facility: CLINIC | Age: 49
End: 2024-06-12
Payer: COMMERCIAL

## 2024-06-12 DIAGNOSIS — Z90.710 STATUS POST LAPAROSCOPIC HYSTERECTOMY: Primary | ICD-10-CM

## 2024-06-12 RX ORDER — ESTRADIOL 0.1 MG/G
CREAM VAGINAL
Qty: 42.5 G | Refills: 3 | Status: SHIPPED | OUTPATIENT
Start: 2024-06-13

## 2024-06-12 NOTE — TELEPHONE ENCOUNTER
Prior Auth received for Premarin 0.625mg/GM. Per CMM MUST USE ESTRADIOL VAGINAL CREAM, IMVEXXY, VAGIFEM OR MED NEC PA ONLY [id number] REQUIRES PRIOR AUTHORIZATION(PHARMACY HELP DESK [phone]). Pending.

## 2024-06-12 NOTE — TELEPHONE ENCOUNTER
Update: Formulary alternatives are: estradiol vaginal cream, Imvexxy, Vagifem. Msg sent to Dr. Vargas

## 2024-07-03 ENCOUNTER — OFFICE VISIT (OUTPATIENT)
Dept: OBSTETRICS AND GYNECOLOGY | Facility: CLINIC | Age: 49
End: 2024-07-03
Payer: COMMERCIAL

## 2024-07-03 VITALS
DIASTOLIC BLOOD PRESSURE: 84 MMHG | SYSTOLIC BLOOD PRESSURE: 128 MMHG | HEIGHT: 67 IN | BODY MASS INDEX: 27.78 KG/M2 | WEIGHT: 177 LBS

## 2024-07-03 DIAGNOSIS — Z79.890 HORMONE REPLACEMENT THERAPY: ICD-10-CM

## 2024-07-03 DIAGNOSIS — Z90.710 STATUS POST LAPAROSCOPIC HYSTERECTOMY: ICD-10-CM

## 2024-07-03 PROCEDURE — 99024 POSTOP FOLLOW-UP VISIT: CPT | Performed by: OBSTETRICS & GYNECOLOGY

## 2024-07-03 RX ORDER — ESTRADIOL 0.05 MG/D
1 PATCH TRANSDERMAL WEEKLY
Qty: 12 PATCH | Refills: 3 | Status: SHIPPED | OUTPATIENT
Start: 2024-07-03

## 2024-07-03 RX ORDER — ESTRADIOL 0.1 MG/G
CREAM VAGINAL
Qty: 42.5 G | Refills: 11 | Status: SHIPPED | OUTPATIENT
Start: 2024-07-03

## 2024-07-03 NOTE — PROGRESS NOTES
OBGYN Postoperative Exam Note          Subjective   Chief Complaint   Patient presents with    Post-op     YasmeenLor Penn is a 48 y.o. year old  presenting to be seen for her post-operative visit. She is S/P TLH with left salpingo-oophorectomy on 2024 at [facility] for Menorrhagia and U[facility]ine Fibroids. Currently she reports no problems with eating, bowel movements, voiding, or wound drainage and pain is well controlled.    The results were discussed with Mayelin.    Patient previously seen on 2024 for post operative vaginal bleeding with clots. Patient had granulation tissue on her cuff and was cauterized. Patient prescribed vaginal estrogen. Patient states that she has not had any vaginal bleeding since her last visit. Patient states that she completed the first 2 weeks of vaginal estrogen and is now twice weekly.     Patient states that she is having issues getting the patch to stick. Patient states that her patch continues to lift on the edges. Patient will need patch in a 90 day supply in her estrogen patch.     OTHER THINGS SHE WANTS TO DISCUSS TODAY:  Nothing else      Current Outpatient Medications:     Cholecalciferol 25 MCG (1000 UT) tablet, Take 1 tablet by mouth Daily., Disp: , Rfl:     estradiol (CLIMARA) 0.05 MG/24HR patch, Place 1 patch on the skin as directed by provider 1 (One) Time Per Week., Disp: 12 patch, Rfl: 3    estradiol (ESTRACE VAGINAL) 0.1 MG/GM vaginal cream, Insert 0.5 g into vagina daily x 2 weeks, then insert twice weekly, Disp: 42.5 g, Rfl: 11    ferrous sulfate 325 (65 FE) MG tablet, Take 1 tablet by mouth every night at bedtime., Disp: , Rfl:     lamoTRIgine (LaMICtal) 150 MG tablet, Take 2 tablets by mouth 2 (Two) Times a Day., Disp: , Rfl:     magnesium oxide (MAG-OX) 400 MG tablet, Take 1 tablet by mouth Daily., Disp: , Rfl:     metoprolol succinate XL (TOPROL-XL) 25 MG 24 hr tablet, Take 1 tablet by mouth every night at bedtime., Disp: , Rfl:      multivitamin with minerals tablet tablet, Take 1 tablet by mouth Daily. OTC, Disp: , Rfl:     Psyllium (Metamucil) wafer wafer, Take  by mouth Daily., Disp: , Rfl:     sertraline (ZOLOFT) 50 MG tablet, Take 1 tablet by mouth every night at bedtime., Disp: , Rfl:      Past Medical History:   Diagnosis Date    Abnormal Pap smear of cervix     Anxiety     Bilateral ovarian cysts     Leiomyoma of uterus     Seizures     Dr. Parisi last seizure 2024    Tachycardia         Past Surgical History:   Procedure Laterality Date    APPENDECTOMY      CERVICAL BIOPSY  2019    benign, negative for dysplasia or carcinoma    CERVICAL CONIZATION       SECTION       SECTION PRIMARY      DIAGNOSTIC LAPAROSCOPY, SALPINGO OOPHORECTOMY LAPAROSCOPIC N/A 11/10/2021    Procedure: REMOVAL OF RIGHT OVARIAN MASS, LAPROSCOPIC;  Surgeon: Ana Vargas MD;  Location: [facility] OR;  Service: Obstetrics/Gynecology;  Laterality: N/A;    TOTAL LAPAROSCOPIC HYSTERECTOMY N/A 2024    Procedure: TOTAL LAPAROSCOPIC HYSTERECTOMY LEFT SALPINGOOPHERECTOMY;  Surgeon: Ana Vargas MD;  Location: [facility] OR;  Service: Obstetrics/Gynecology;  Laterality: N/A;    TUBAL ABDOMINAL LIGATION      VULVA BIOPSY Right 2024    Procedure: VULVA BIOPSY;  Surgeon: Ana Vargas MD;  Location: [facility] OR;  Service: Obstetrics/Gynecology;  Laterality: Right;       The following portions of the patient's history were reviewed and updated as appropriate:current medications and allergies    Review of Systems   Constitutional: Negative.    HENT: Negative.     Eyes: Negative.    Respiratory: Negative.     Cardiovascular: Negative.    Gastrointestinal: Negative.    Endocrine: Negative.    Genitourinary: Negative.    Musculoskeletal: Negative.    Skin: Negative.    Allergic/Immunologic: Negative.    Neurological: Negative.    Hematological: Negative.    Psychiatric/Behavioral: Negative.            Objective   /84   Ht 170.2 cm  "(67\")   Wt 80.3 kg (177 lb)   LMP 05/09/2024 (Exact Date)   BMI 27.72 kg/m²     Physical Exam  Vitals and nursing note reviewed.   Constitutional:       Appearance: She is well-developed.   HENT:      Head: Normocephalic and atraumatic.   Eyes:      Conjunctiva/sclera: Conjunctivae normal.   Pulmonary:      Effort: Pulmonary effort is normal.   Abdominal:      General: A surgical scar is present.      Palpations: Abdomen is soft. Abdomen is not rigid.      Hernia: There is no hernia in the left inguinal area or right inguinal area.      Comments: Clean, Dry, and Intact.  No erythema.    Genitourinary:     General: Normal vulva.      Vagina: Normal.      Comments: Cuff well healed  Musculoskeletal:      Cervical back: Normal range of motion.   Neurological:      Mental Status: She is alert and oriented to person, place, and time.   Psychiatric:         Mood and Affect: Mood normal.         Behavior: Behavior normal.              Assessment   S/P TLH/BSO     Plan   May return to limited activity immediately with the following restrictions of nothing vaginal for another 4-6 weeks.  Refill of ERT given  The importance of keeping all planned follow-up and taking all medications as prescribed was emphasized.  Return in about 1 year (around 7/3/2025).              Ana Vargas MD  07/03/2024     "

## 2024-07-26 DIAGNOSIS — Z12.31 ENCOUNTER FOR SCREENING MAMMOGRAM FOR BREAST CANCER: Primary | ICD-10-CM

## 2024-08-19 ENCOUNTER — TELEPHONE (OUTPATIENT)
Dept: OBSTETRICS AND GYNECOLOGY | Facility: CLINIC | Age: 49
End: 2024-08-19
Payer: COMMERCIAL

## 2024-08-19 NOTE — TELEPHONE ENCOUNTER
Reports d/c started after her hysterectomy. Denies bleeding, itching, burning, odor. She reports the d/c is usually when she is up and active. It is just a slight wet spot in her underwear. She denies d/c prior to her hysterectomy. She is using her estrace cream 2 times per week.

## 2024-08-19 NOTE — TELEPHONE ENCOUNTER
Caller: SHANIQUA HOWARD     Relationship: SELF    Best call back number:[phone]    What is the best time to reach you: ANY    Who are you requesting to speak with (clinical staff, provider,  specific staff member): CLINICAL      What was the call regarding:   PT CALLED TO LET [facility] KNOW THAT SHE IS STILL HAVING A LITTLE DISCHARGE, WHITE WITH HINT OF YELLOW, NO ODOR. PT STATES SHE USES ESTRADIOL X2 DAYS A WEEK. PT IS NEEDING TO KNOW IF THIS IS OK OR IF SHE NEEDS TO BE SEEN.

## 2024-10-31 ENCOUNTER — TELEPHONE (OUTPATIENT)
Dept: OBSTETRICS AND GYNECOLOGY | Facility: CLINIC | Age: 49
End: 2024-10-31
Payer: COMMERCIAL

## 2024-10-31 NOTE — TELEPHONE ENCOUNTER
Pt called and stated that she had her hysterectomy in may and ever since then she has been losing her eyelashes more than before. Pt has heard of a medication being prescribed to help this. Pt would like a call back to discuss

## 2024-10-31 NOTE — TELEPHONE ENCOUNTER
LOS patient   LOV: 7/3/24    Patient called about losing her eyelashes since having hysterectomy on 5/9/24. She states she barely has any eyelashes left and is wondering if she can get a prescription for Latisse. RN will discuss with MD and call patient back. Patient LOUANN.    Per BONG Acuña, patient can go to ophthalmologist for prescription for Latisse as that is more of their specialty. Patient LOUANN.

## 2025-03-20 NOTE — ANESTHESIA PREPROCEDURE EVALUATION
Anesthesia Evaluation     Patient summary reviewed and Nursing notes reviewed   no history of anesthetic complications:  NPO Solid Status: > 8 hours  NPO Liquid Status: > 8 hours           Airway   Mallampati: II  TM distance: >3 FB  Neck ROM: full  No difficulty expected  Dental      Pulmonary - negative pulmonary ROS and normal exam   Cardiovascular - negative cardio ROS and normal exam        Neuro/Psych- negative ROS  GI/Hepatic/Renal/Endo      Musculoskeletal     Abdominal    Substance History      OB/GYN          Other                        Anesthesia Plan    ASA 1     general     intravenous induction     Anesthetic plan, all risks, benefits, and alternatives have been provided, discussed and informed consent has been obtained with: patient.    Plan discussed with CRNA.       NOTIFICATION OF ADMISSION DISCHARGE   This is a Notification of Discharge from [facility]. Please be advised that this patient has been discharge from our facility. Below you will find the admission and discharge date and time including the patient’s disposition.   UTILIZATION REVIEW CONTACT:  Becca Morrison  Utilization   Network Utilization Review Department  Phone: [phone] x carefully listen to the prompts. All voicemails are confidential.  Email: [email or web address]     ADMISSION INFORMATION  PRESENTATION DATE: 3/17/2025  7:15 AM  OBERVATION ADMISSION DATE: N/A  INPATIENT ADMISSION DATE: 3/18/25  3:19 PM   DISCHARGE DATE: 3/19/2025  4:55 PM   DISPOSITION:Home/Self Care    Network Utilization Review Department  ATTENTION: Please call with any questions or concerns to [phone] and carefully listen to the prompts so that you are directed to the right person. All voicemails are confidential.   For Discharge needs, contact Care Management DC Support Team at [phone] opt. 2  Send all requests for admission clinical reviews, approved or denied determinations and any other requests to dedicated fax number below belonging to the campus where the patient is receiving treatment. List of dedicated fax numbers for the Facilities:  FACILITY NAME UR FAX NUMBER   ADMISSION DENIALS (Administrative/Medical Necessity) [phone]   DISCHARGE SUPPORT TEAM ([facility]) [phone]   PARENT CHILD HEALTH (Maternity/NICU/Pediatrics) [phone]   [facility] [phone]   [facility] [phone]   [facility] [phone]   [facility] [phone]   [facility] [phone]   [facility] [phone]   [facility] [phone]   [facility] [phone]    [facility] [phone]   [facility] [phone]   [facility] [phone]   [facility] [phone]

## 2025-04-24 DIAGNOSIS — Z79.890 HORMONE REPLACEMENT THERAPY: ICD-10-CM

## 2025-04-24 RX ORDER — ESTRADIOL 0.05 MG/D
1 PATCH TRANSDERMAL WEEKLY
Qty: 12 PATCH | Refills: 3 | Status: SHIPPED | OUTPATIENT
Start: 2025-04-24

## 2025-05-07 ENCOUNTER — TELEPHONE (OUTPATIENT)
Dept: OBSTETRICS AND GYNECOLOGY | Facility: CLINIC | Age: 50
End: 2025-05-07
Payer: COMMERCIAL

## 2025-05-07 NOTE — TELEPHONE ENCOUNTER
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

## 2025-05-07 NOTE — TELEPHONE ENCOUNTER
Mayelin is interested in looking into testosterone replacement. After discussing risks/benefits, she will wait and talk with Dr. Vargas at her annual exam in July. Advised labs will need to be done prior to determine if she is a candidate. She VU.

## 2025-07-10 ENCOUNTER — OFFICE VISIT (OUTPATIENT)
Dept: OBSTETRICS AND GYNECOLOGY | Facility: CLINIC | Age: 50
End: 2025-07-10
Payer: COMMERCIAL

## 2025-07-10 VITALS — DIASTOLIC BLOOD PRESSURE: 82 MMHG | BODY MASS INDEX: 29.44 KG/M2 | SYSTOLIC BLOOD PRESSURE: 126 MMHG | WEIGHT: 188 LBS

## 2025-07-10 DIAGNOSIS — Z12.11 COLON CANCER SCREENING: ICD-10-CM

## 2025-07-10 DIAGNOSIS — Z90.710 STATUS POST LAPAROSCOPIC HYSTERECTOMY: Primary | ICD-10-CM

## 2025-07-10 DIAGNOSIS — Z01.419 WOMEN'S ANNUAL ROUTINE GYNECOLOGICAL EXAMINATION: ICD-10-CM

## 2025-07-10 DIAGNOSIS — Z12.39 ENCOUNTER FOR BREAST CANCER SCREENING USING NON-MAMMOGRAM MODALITY: ICD-10-CM

## 2025-07-10 DIAGNOSIS — Z79.890 HORMONE REPLACEMENT THERAPY: ICD-10-CM

## 2025-07-10 RX ORDER — ESTRADIOL 0.05 MG/D
1 PATCH TRANSDERMAL WEEKLY
Qty: 12 PATCH | Refills: 3 | Status: SHIPPED | OUTPATIENT
Start: 2025-07-10

## 2025-07-10 RX ORDER — ESTRADIOL 0.1 MG/G
CREAM VAGINAL
Qty: 42.5 G | Refills: 11 | Status: SHIPPED | OUTPATIENT
Start: 2025-07-10

## 2025-07-10 NOTE — PROGRESS NOTES
Gynecologic Annual Exam Note        GYN Annual Exam     CC - Here for annual exam.        HPI  Mayelin Penn is a 49 y.o. female, , who presents for annual well woman exam as a established patient.  She is s/p TLH/LSO in May 2024 with history of RSO in  for menorrhagia and fibroids..Denies vaginal bleeding.   There were no changes to her medical or surgical history since her last visit. Marital Status: .  She is sexually active. She has not had new partners.. STD testing recommendations have been explained to the patient and she declines STD testing.    The patient would like to discuss the following complaints today: none    Additional OB/GYN History   On HRT? Yes. Details: Climara and estrace vaginal cream    Last Pap : 23. Results: negative. HPV: not done. Her last HPV testing was unknown..   Last Completed Pap Smear    This patient has no relevant Health Maintenance data.       History of abnormal Pap smear: yes -    Family history of uterine, colon, breast, or ovarian cancer: no  Performs monthly Self-Breast Exam: yes  Last mammogram: 24. Done at [facility]. There is a copy in the chart.    Last Completed Mammogram            Awaiting Completion       MAMMOGRAM (Every 2 Years) Order placed this encounter      07/10/2025  Order placed for Mammo Screening Digital Tomosynthesis Bilateral With CAD by Ana Vargas MD    2024  MAMMO Scan    2022  SCANNED - MAMMO    2020  Done - benign; [facility]                          Last colonoscopy: has never had a colonoscopy or cologuard    Last Completed Colonoscopy    This patient has no relevant Health Maintenance data.         She has never had a bone density scan  Exercises Regularly: yes, weight gain since TLH/ LSO in .   Feelings of Anxiety or Depression: no      Tobacco Usage?: No       Current Outpatient Medications:     estradiol (CLIMARA) 0.05 MG/24HR patch, Place 1 patch  on the skin as directed by provider 1 (One) Time Per Week., Disp: 12 patch, Rfl: 3    estradiol (ESTRACE VAGINAL) 0.1 MG/GM vaginal cream, Insert 0.5 g into vagina daily x 2 weeks, then insert twice weekly, Disp: 42.5 g, Rfl: 11    lamoTRIgine (LaMICtal) 150 MG tablet, Take 2 tablets by mouth 2 (Two) Times a Day., Disp: , Rfl:     magnesium oxide (MAG-OX) 400 MG tablet, Take 1 tablet by mouth Daily., Disp: , Rfl:     metoprolol succinate XL (TOPROL-XL) 25 MG 24 hr tablet, Take 1 tablet by mouth every night at bedtime., Disp: , Rfl:     multivitamin with minerals tablet tablet, Take 1 tablet by mouth Daily. OTC, Disp: , Rfl:     sertraline (ZOLOFT) 50 MG tablet, Take 1 tablet by mouth every night at bedtime., Disp: , Rfl:     Cholecalciferol 25 MCG (1000 UT) tablet, Take 1 tablet by mouth Daily. (Patient not taking: Reported on 7/10/2025), Disp: , Rfl:     ferrous sulfate 325 (65 FE) MG tablet, Take 1 tablet by mouth every night at bedtime. (Patient not taking: Reported on 7/10/2025), Disp: , Rfl:     Psyllium (Metamucil) wafer wafer, Take  by mouth Daily. (Patient not taking: Reported on 7/10/2025), Disp: , Rfl:     Patient is requesting refills of Climara and estrace vaginal cream.    OB History          3    Para   3    Term   0       0    AB   0    Living   3         SAB   0    IAB   0    Ectopic   0    Molar   0    Multiple   0    Live Births   3                Past Medical History:   Diagnosis Date    Abnormal Pap smear of cervix     Anxiety     Bilateral ovarian cysts     Leiomyoma of uterus     Seizures     Dr. Parisi last seizure 2024    Tachycardia         Past Surgical History:   Procedure Laterality Date    APPENDECTOMY      CERVICAL BIOPSY  2019    benign, negative for dysplasia or carcinoma    CERVICAL CONIZATION       SECTION       SECTION PRIMARY      DIAGNOSTIC LAPAROSCOPY, SALPINGO OOPHORECTOMY LAPAROSCOPIC N/A 11/10/2021    Procedure: REMOVAL OF  RIGHT OVARIAN MASS, LAPROSCOPIC;  Surgeon: Ana Vargas MD;  Location:  [facility];  Service: Obstetrics/Gynecology;  Laterality: N/A;    TOTAL LAPAROSCOPIC HYSTERECTOMY N/A 5/9/2024    Procedure: TOTAL LAPAROSCOPIC HYSTERECTOMY LEFT SALPINGOOPHERECTOMY;  Surgeon: Ana Vargas MD;  Location:  [facility];  Service: Obstetrics/Gynecology;  Laterality: N/A;    TUBAL ABDOMINAL LIGATION      VULVA BIOPSY Right 5/9/2024    Procedure: VULVA BIOPSY;  Surgeon: Ana Vargas MD;  Location:  [facility];  Service: Obstetrics/Gynecology;  Laterality: Right;       Health Maintenance   Topic Date Due    TDAP/TD VACCINES (1 - Tdap) Never done    HEPATITIS C SCREENING  Never done    ANNUAL PHYSICAL  Never done    COLORECTAL CANCER SCREENING  Never done    Annual Gynecologic Pelvic and Breast Exam  07/01/2024    COVID-19 Vaccine (1 - 2024-25 season) Never done    INFLUENZA VACCINE  10/01/2025    MAMMOGRAM  08/02/2026    Pneumococcal Vaccine 0-49  Aged Out       The additional following portions of the patient's history were reviewed and updated as appropriate: allergies, current medications, past family history, past medical history, past social history, past surgical history, and problem list.    Review of Systems    I have reviewed and agree with the HPI, ROS, and historical information as entered above. Ana Vargas MD      Objective   /82   Wt 85.3 kg (188 lb)   LMP 05/09/2024 (Exact Date)   BMI 29.44 kg/m²     Physical Exam  Vitals and nursing note reviewed. Exam conducted with a chaperone present.   Constitutional:       Appearance: She is well-developed.   HENT:      Head: Normocephalic and atraumatic.   Neck:      Thyroid: No thyroid mass or thyromegaly.   Cardiovascular:      Rate and Rhythm: Normal rate and regular rhythm.      Heart sounds: No murmur heard.  Pulmonary:      Effort: Pulmonary effort is normal. No retractions.      Breath sounds: Normal breath sounds. No wheezing, rhonchi or rales.   Chest:       Chest wall: No mass or tenderness.   Breasts:     Right: Normal. No mass, nipple discharge, skin change or tenderness.      Left: Normal. No mass, nipple discharge, skin change or tenderness.   Abdominal:      General: Bowel sounds are normal.      Palpations: Abdomen is soft. Abdomen is not rigid. There is no mass.      Tenderness: There is no abdominal tenderness. There is no guarding.      Hernia: No hernia is present. There is no hernia in the left inguinal area or right inguinal area.   Genitourinary:     General: Normal vulva.      Exam position: Lithotomy position.      Pubic Area: No rash.       Labia:         Right: No rash, tenderness or lesion.         Left: No rash, tenderness or lesion.       Urethra: No urethral pain or urethral swelling.      Vagina: Normal. No vaginal discharge or lesions.      Uterus: Absent.       Adnexa:         Right: No mass, tenderness or fullness.          Left: No mass, tenderness or fullness.        Rectum: No external hemorrhoid.      Comments: Cervix surgically absent.  Vaginal cuff intact.  Musculoskeletal:      Cervical back: Normal range of motion. No muscular tenderness.   Neurological:      Mental Status: She is alert and oriented to person, place, and time.   Psychiatric:         Behavior: Behavior normal.            Assessment and Plan    Problem List Items Addressed This Visit          Genitourinary and Reproductive     Women's annual routine gynecological examination    Status post laparoscopic hysterectomy/LSO ( previous RSO) - Primary    Relevant Medications    estradiol (ESTRACE VAGINAL) 0.1 MG/GM vaginal cream     Other Visit Diagnoses         Encounter for breast cancer screening using non-mammogram modality        Relevant Orders    Mammo Screening Digital Tomosynthesis Bilateral With CAD      Hormone replacement therapy          Colon cancer screening        Relevant Orders    Cologuard - Stool, Per Rectum            GYN annual well woman exam.    Recommended use of Vitamin D replacement and getting adequate calcium in her diet. (1500mg)  Continue yearly mammography.  Reviewed self breast awareness.  Instructed to call with lumps, pain, or breast discharge.    Reviewed colonoscopy recommendations to start routine colonoscopy at age 47.  She has option of Cologuard if no family history of colon cancer or precancerous polyps.  She prefers to go ahead with Cologuard.     Reviewed ERT risks and benefits and patient understands the risks of pulmonary embolism is increased however with transdermal routes the risk of VTE is less than oral .  In addition cases of coronary heart disease invasive breast cancer stroke colorectal cancer hip fracture and all cause mortality with estrogen replacement therapy.  Continue vaginal estrogen for GSM prevention.  Reviewed HPV guidelines.  Reviewed exercise as a preventative health measures.    coloReturn in about 1 year (around 7/10/2026).         Ana Vargas MD  07/10/2025

## 2025-07-18 ENCOUNTER — TELEPHONE (OUTPATIENT)
Dept: OBSTETRICS AND GYNECOLOGY | Facility: CLINIC | Age: 50
End: 2025-07-18
Payer: COMMERCIAL

## 2025-07-18 NOTE — TELEPHONE ENCOUNTER
Let patient know LOS is out of office today but will return Tuesday. Will discuss with her then and call back. Patient v/u.

## 2025-07-18 NOTE — TELEPHONE ENCOUNTER
Caller: Mayelin Penn    Relationship: Self    Best call back number: [phone]        Who are you requesting to speak with (clinical staff, DR. EAGLE        What was the call regarding: PATIENT WOULD LIKE TO SCHEDULE A COLONOSCOPY, PLEASE ASSIST AND ADVISE.

## 2025-07-22 ENCOUNTER — TELEPHONE (OUTPATIENT)
Dept: OBSTETRICS AND GYNECOLOGY | Facility: CLINIC | Age: 50
End: 2025-07-22
Payer: COMMERCIAL

## 2025-07-22 DIAGNOSIS — Z12.11 COLON CANCER SCREENING: Primary | ICD-10-CM

## 2025-07-22 NOTE — TELEPHONE ENCOUNTER
Discussed with patient, LOS prefers [facility] or [facility]. Patient prefers to have this done at [facility]. Referral placed.

## 2025-07-22 NOTE — TELEPHONE ENCOUNTER
Caller: Mayelin Penn    Relationship: Self    Best call back number: [phone]    What is the medical concern/diagnosis: COLONOSCOPY     What specialty or service is being requested: GEN SURG OR COLON SPECIALIST    What is the provider, practice or medical service name: ?? WOULD LIKE TO GET A RECOMMENDATION FROM DR. EAGLE ON WHO SHE WOULD ADVISE PT TO SEE THAT COULD EASE PT'S ANIXETY OVER THIS    What is the office location: [place]    What is the office phone number: ???    Any additional details: PLEASE ADVISE / SEND A REFERRAL OR CALL TO DISCUSS

## (undated) DEVICE — CYSTO/BLADDER IRRIGATION SET, REGULATING CLAMP

## (undated) DEVICE — HARMONIC ACE +7 LAPAROSCOPIC SHEARS ADVANCED HEMOSTASIS 5MM DIAMETER 36CM SHAFT LENGTH  FOR USE WITH GRAY HAND PIECE ONLY: Brand: HARMONIC ACE

## (undated) DEVICE — GLV SURG SIGNATURE TOUCH PF LTX 6 STRL

## (undated) DEVICE — 3M™ STERI-STRIP™ REINFORCED ADHESIVE SKIN CLOSURES, R1547, 1/2 IN X 4 IN (12 MM X 100 MM), 6 STRIPS/ENVELOPE: Brand: 3M™ STERI-STRIP™

## (undated) DEVICE — SYR LUERLOK 30CC

## (undated) DEVICE — GLV SURG SIGNATURE TOUCH PF LTX 6.5 STRL BX/50

## (undated) DEVICE — [HIGH FLOW INSUFFLATOR,  DO NOT USE IF PACKAGE IS DAMAGED,  KEEP DRY,  KEEP AWAY FROM SUNLIGHT,  PROTECT FROM HEAT AND RADIOACTIVE SOURCES.]: Brand: PNEUMOSURE

## (undated) DEVICE — LAPAROVUE VISIBILITY SYSTEM LAPAROSCOPIC SOLUTIONS: Brand: LAPAROVUE

## (undated) DEVICE — APPL CHLORAPREP TINTED 26ML TEAL

## (undated) DEVICE — BNDG ADHS PLSTC 1X3IN LF

## (undated) DEVICE — CVR HNDL LIGHT RIGID

## (undated) DEVICE — ENDOCUT SCISSOR TIP, DISPOSABLE: Brand: RENEW

## (undated) DEVICE — PK LAP HYST 10

## (undated) DEVICE — MANIP UTER RUMI 2 KOH EFFICIENT SS CP 3.5CM

## (undated) DEVICE — ENDOPATH XCEL UNIVERSAL TROCAR STABLILITY SLEEVES: Brand: ENDOPATH XCEL

## (undated) DEVICE — HDRST POSTIN FM CRDL TRACH SLOT 9X8X4IN

## (undated) DEVICE — GUIDE CONE FOR LAP/PORT CLS 1X5MM 1X10/12MM

## (undated) DEVICE — Device

## (undated) DEVICE — ENDOPOUCH RETRIEVER SPECIMEN RETRIEVAL BAGS: Brand: ENDOPOUCH RETRIEVER

## (undated) DEVICE — ANTIBACTERIAL UNDYED BRAIDED (POLYGLACTIN 910), SYNTHETIC ABSORBABLE SUTURE: Brand: COATED VICRYL

## (undated) DEVICE — TROCAR: Brand: KII FIOS FIRST ENTRY

## (undated) DEVICE — SUTURING DEVICE: Brand: ENDO STITCH

## (undated) DEVICE — SUT VIC 0 TIES 18IN J912G

## (undated) DEVICE — LAPAROSCOPIC SMOKE FILTRATION SYSTEM: Brand: PALL LAPAROSHIELD® PLUS LAPAROSCOPIC SMOKE FILTRATION SYSTEM

## (undated) DEVICE — VICRYL VLT 0 45CM ENDOLOOP: Brand: ENDOLOOP

## (undated) DEVICE — WOUND RETRACTOR AND PROTECTOR: Brand: ALEXIS O WOUND PROTECTOR-RETRACTOR

## (undated) DEVICE — CUFF SCD HEMOFORCE SEQ CALF STD MD

## (undated) DEVICE — MANIP UTER RUMI TP 6.7MM 10CM GRN

## (undated) DEVICE — ENDOPATH XCEL BLADELESS TROCARS WITH STABILITY SLEEVES: Brand: ENDOPATH XCEL

## (undated) DEVICE — VIOLET POLYDIOXANONE POLYMER, SYNTHETIC ABSORBABLE SUTURE CLIPS: Brand: LAPRA-TY

## (undated) DEVICE — SCRB SURG BACTOSHIELD CHG 4PCT 4OZ

## (undated) DEVICE — DEFOGGER!" ANTI FOG KIT: Brand: DEROYAL

## (undated) DEVICE — APPL CHLORAPREP 26ML CLR

## (undated) DEVICE — ABSORBABLE SINGLE STITCH RELOAD: Brand: POLYSORB

## (undated) DEVICE — ADHS SKIN PREMIERPRO EXOFIN TOPICAL HI/VISC .5ML

## (undated) DEVICE — HDRST POSTIN FM CRDL TRACH SLOT NONCOMRESS 9X8X4IN

## (undated) DEVICE — NDL HYPO ECLPS SFTY 18G 1 1/2IN

## (undated) DEVICE — TRENDELENBURG WINGPAD POSITIONING KIT DELUXE - WITHOUT BODY STRAP: Brand: SOULE MEDICAL

## (undated) DEVICE — ADHS LIQ MASTISOL 2/3ML

## (undated) DEVICE — OCCL COLPO PNEUMO  STRL

## (undated) DEVICE — SYR LUERLOK 50ML

## (undated) DEVICE — ENDOPATH 10MM ENDOSCOPIC BLUNT CHERRY DISSECTORS (12 POUCHES CONTAINING 3 DISSECTORS EACH): Brand: ENDOPATH